# Patient Record
Sex: FEMALE | Race: WHITE
[De-identification: names, ages, dates, MRNs, and addresses within clinical notes are randomized per-mention and may not be internally consistent; named-entity substitution may affect disease eponyms.]

---

## 2017-03-07 ENCOUNTER — HOSPITAL ENCOUNTER (OUTPATIENT)
Dept: HOSPITAL 75 - FS | Age: 82
End: 2017-03-07
Attending: INTERNAL MEDICINE
Payer: MEDICARE

## 2017-03-07 DIAGNOSIS — Z79.899: ICD-10-CM

## 2017-03-07 DIAGNOSIS — Z92.21: ICD-10-CM

## 2017-03-07 DIAGNOSIS — E78.00: ICD-10-CM

## 2017-03-07 DIAGNOSIS — G62.9: ICD-10-CM

## 2017-03-07 DIAGNOSIS — E11.40: ICD-10-CM

## 2017-03-07 DIAGNOSIS — Z92.3: ICD-10-CM

## 2017-03-07 DIAGNOSIS — I10: ICD-10-CM

## 2017-03-07 DIAGNOSIS — Z90.11: ICD-10-CM

## 2017-03-07 DIAGNOSIS — L20.9: ICD-10-CM

## 2017-03-07 DIAGNOSIS — C50.111: Primary | ICD-10-CM

## 2017-03-07 DIAGNOSIS — Z17.1: ICD-10-CM

## 2017-03-07 PROCEDURE — 99213 OFFICE O/P EST LOW 20 MIN: CPT

## 2017-03-07 NOTE — XMS REPORT
Continuity of Care Document

 Created on: 2016



RAQUEL BRAR

External Reference #: [id number]

: 1935

Sex: Female



Demographics







 Address  [address] [place]  [address]

 

 Home Phone  [phone]

 

 Preferred Language  English

 

 Marital Status  Unknown

 

 Mu-ism Affiliation  Unknown

 

 Race  Unknown

 

 Ethnic Group  Unknown





Author







 Author  [facility]

 

 Organization  [facility]

 

 Address  Unknown

 

 Phone  Unavailable







Support







 Name  Relationship  Address  Phone

 

 CHRISTOS LAWLER MD  Caregiver  [address] [place]  [address] [phone]

 

 KELLY DONOVAN MD  Caregiver  [address] [place]  [address] [phone]

 

 SMITH BRAR  Next Of Kin  [address] [place]  [address] [phone]







Care Team Providers







 Care Team Member Name  Role  Phone

 

 KELLY DONOVAN MD  PCP  [phone]







Insurance Providers







 Payer Name  Policy Number  Subscriber Name  Relationship

 

 [employer]  [id number]  Raquel Brar  18 Self / Same As Patient

 

 [employer] Ins Name  [id number]  Raquel Brra  18 Self / Same As 
Patient







Advance Directives







 Directive  Response  Recorded Date/Time

 

 Advance Directives  No  16 11:07am

 

 Health Care Power of   No  16 11:07am

 

 Organ Donor  Yes  16 11:07am

 

 Resuscitation Status  Full Code  16 11:07am







Problems

No problem information available.



Medications

Current Home Medications







 Medication  Dose  Units  Route  Directions  Days/Qty  Instructions  Start Date

 

 Glipizide (Glucotrol) 10 Mg  10  Mg  Oral  Bedtime        07/13/15

 

 Gabapentin 300 Mg  300  Mg  Oral  Three Times A Day        07/13/15

 

 Levothyroxine Sodium (Levothroid) 100 Mcg  100  Mcg  Oral  Daily        07/13/
15

 

 Metformin Hcl (Glucophage) 1,000 Mg  1,000  Mg  Oral  Bedtime        07/13/15

 

 Sitagliptin Phosphate 100 Mg  100  Mg  Oral  Bedtime        07/13/15

 

 Lisinopril 10 Mg  5  Mg  Oral  Daily     TAKE 1/2 OF (10MG) TAB  07/17/15

 

 Hydrocodone/Acetaminophen 1 Each  1  Each  Oral  Every 4HRS as needed for Pain
        09/24/15

 

 Tramadol Hcl 50 Mg  50  Mg  Oral  Every 12 Hours as needed for Pain  30     09/
25/15





Past Home Medications







 Medication  Directions  Ordered  Status

 

 Lisinopril (Lisinopril 5MG) 5 Mg Tablet, 5 Mg Oral  Daily  07/13/15  
Discontinued







Social History







 Social History Problem  Response  Recorded Date/Time

 

 Alcohol Use  Denies Use  2015 12:23pm

 

 Recreational Drug Use  No  2015 12:23pm

 

 Recent Foreign Travel  No  2016 11:16am

 

 Recent Infectious Disease Exposure  No  2016 11:16am

 

 Smoking Status  Never a Smoker  2016 11:15am

 

 Do you dip or chew tobacco?  No  2015 12:00pm









 Query  Response  Start Date  Stop Date

 

 Smoking Status  Never a Smoker      







Hospital Discharge Instructions

 

Patient Instructions

Physician Instructions

 

New, Converted or Re-Newed RX:  Other

Plan of Care/Instructions/FU:  

repeat colonoscopy in 2 years

Activity as Tolerated:  Yes

Discharge Diet:  ADA Diet

Pneu Vac Indicated:  Yes

 

 

Care Plan

Patient Instructions:: repeat colonoscopy in 2 years

 



Plan of Care







 Discharge Date  16 2:10pm

 

 Instructions/Education Provided  COLONOSCOPY

 

 Prescriptions  See Medication Section







Functional Status

No functional status results.



Allergies, Adverse Reactions, Alerts







 Allergen  Type  Severity  Reaction  Status  Last Updated

 

 Morphine  Allergy  Unknown     Active  10/01/15

 

 hydrocodone ([id number])  Allergy  Unknown     Active  10/01/15

 

 Acetaminophen  Allergy  Unknown     Active  10/01/15

 

 Ibuprofen  Allergy  Unknown     Active  10/01/15

 

 ofloxacin ([id number])  Allergy  Unknown     Active  10/01/15

 

 loratadine ([id number])  Allergy  Unknown     Active  10/01/15

 

 atorvastatin ([id number])  Allergy  Unknown     Active  10/01/15







Immunizations

No immunization records.



Vital Signs

Acute Vital Signs





 Vital  Response  Date/Time

 

 Temperature (Fahrenheit)  98.4 degrees F (97.6 - 99.5)  2016 2:10pm

 

 Temperature (Calculated Celsius)  36.44920 degrees C (36.4 - 37.5)  2016 
2:10pm

 

 Temperature Source  Tympanic  2016 2:10pm

 

 Pulse Rate (adult)  67 bpm (60 - 90)  2016 2:10pm

 

 Respiratory Rate  20 bpm (12 - 24)  2016 2:10pm

 

 O2 Sat by Pulse Oximetry  97 % (88 - 100)  2016 2:10pm

 

 Blood Pressure  120/52 mm Hg  2016 2:10pm

 

 Pain      

 

    Numeric Pain Scale  0-No Pain  2016 2:10pm

 

    Pain Intensity  0  2016 2:00pm

 

 Height (Feet)  5 feet  2016 11:17am

 

 Height (Inches)  0.00 inches  2016 11:17am

 

 Height (Calculated Centimeters)  152.476284 cm  2016 11:17am

 

 Weight (Pounds)  151 pounds  2016 11:17am

 

 Weight (Ounces)  0.0 oz  2016 11:17am

 

 Weight (Calculated Grams)  47208.45 gm  2016 11:17am

 

 Weight (Calculated Kilograms)  68.469515 kilograms  2016 11:17am

 

 Calculated BMI  29.5  2016 11:17am







Results

No known relevant diagnostic tests, laboratory data and/or discharge summary.



Procedures

No known history of procedures.



Encounters







 Encounter  Location  Arrival/Admit Date  Discharge/Depart Date  Attending 
Provider

 

 Departed Surgical Day Care  [facility]  16 10:47am  
16 2:10pm  CHRISTOS LAWLER MD

 

 Departed Clinic  [facility]  16 5:37am  16 12:
58pm  CHRISTOS LAWLER MD

 

 Registered Clinic  [facility]  16 11:03am     LEELEE ROSALES

## 2017-06-06 ENCOUNTER — HOSPITAL ENCOUNTER (OUTPATIENT)
Dept: HOSPITAL 75 - FS | Age: 82
End: 2017-06-06
Attending: INTERNAL MEDICINE
Payer: MEDICARE

## 2017-06-06 DIAGNOSIS — Z90.11: ICD-10-CM

## 2017-06-06 DIAGNOSIS — E11.40: ICD-10-CM

## 2017-06-06 DIAGNOSIS — I10: ICD-10-CM

## 2017-06-06 DIAGNOSIS — Z17.1: ICD-10-CM

## 2017-06-06 DIAGNOSIS — E78.00: ICD-10-CM

## 2017-06-06 DIAGNOSIS — C50.111: Primary | ICD-10-CM

## 2017-06-06 DIAGNOSIS — Z92.3: ICD-10-CM

## 2017-06-06 DIAGNOSIS — Z79.899: ICD-10-CM

## 2017-06-06 DIAGNOSIS — Z92.21: ICD-10-CM

## 2017-06-06 DIAGNOSIS — G62.9: ICD-10-CM

## 2017-06-06 PROCEDURE — 99213 OFFICE O/P EST LOW 20 MIN: CPT

## 2017-06-21 ENCOUNTER — HOSPITAL ENCOUNTER (OUTPATIENT)
Dept: HOSPITAL 75 - PREOP | Age: 82
Discharge: HOME | End: 2017-06-21
Attending: SURGERY
Payer: MEDICARE

## 2017-06-21 VITALS — HEIGHT: 60 IN | BODY MASS INDEX: 30.23 KG/M2 | WEIGHT: 154 LBS

## 2017-06-21 VITALS — SYSTOLIC BLOOD PRESSURE: 145 MMHG | DIASTOLIC BLOOD PRESSURE: 62 MMHG

## 2017-06-21 DIAGNOSIS — C50.919: ICD-10-CM

## 2017-06-21 DIAGNOSIS — Z11.2: ICD-10-CM

## 2017-06-21 DIAGNOSIS — Z01.818: Primary | ICD-10-CM

## 2017-06-21 PROCEDURE — 87081 CULTURE SCREEN ONLY: CPT

## 2017-06-28 ENCOUNTER — HOSPITAL ENCOUNTER (OUTPATIENT)
Dept: HOSPITAL 75 - SDC | Age: 82
Discharge: HOME | End: 2017-06-28
Attending: SURGERY
Payer: MEDICARE

## 2017-06-28 VITALS — SYSTOLIC BLOOD PRESSURE: 153 MMHG | DIASTOLIC BLOOD PRESSURE: 66 MMHG

## 2017-06-28 VITALS — HEIGHT: 60 IN | WEIGHT: 154 LBS | BODY MASS INDEX: 30.23 KG/M2

## 2017-06-28 VITALS — DIASTOLIC BLOOD PRESSURE: 70 MMHG | SYSTOLIC BLOOD PRESSURE: 139 MMHG

## 2017-06-28 VITALS — SYSTOLIC BLOOD PRESSURE: 139 MMHG | DIASTOLIC BLOOD PRESSURE: 70 MMHG

## 2017-06-28 VITALS — SYSTOLIC BLOOD PRESSURE: 129 MMHG | DIASTOLIC BLOOD PRESSURE: 50 MMHG

## 2017-06-28 DIAGNOSIS — C50.919: Primary | ICD-10-CM

## 2017-06-28 DIAGNOSIS — E03.9: ICD-10-CM

## 2017-06-28 DIAGNOSIS — E11.9: ICD-10-CM

## 2017-06-28 DIAGNOSIS — Z79.899: ICD-10-CM

## 2017-06-28 DIAGNOSIS — I10: ICD-10-CM

## 2017-06-28 PROCEDURE — 82962 GLUCOSE BLOOD TEST: CPT

## 2017-06-28 NOTE — XMS REPORT
Continuity of Care Document

 Created on: 2017



REAGAN BRAR

External Reference #: [id number]

: 1935

Sex: Female



Demographics







 Address  [address] [place]  [address]

 

 Home Phone  [phone]

 

 Preferred Language  Unknown

 

 Marital Status  Unknown

 

 Yarsani Affiliation  Unknown

 

 Race  Unknown

 

 Ethnic Group  Unknown





Author







 Author  [facility]

 

 Organization  [facility]

 

 Address  Unknown

 

 Phone  Unavailable



                                                      



Allergies

                      





 Active                    Description                    Code                  
  Type                    Severity                    Reaction                  
  Onset                    Reported/Identified                    Relationship 
to Patient                    Clinical Status                

 

 Yes                    No Known Allergies                    [id number]       
             Drug Allergy                    Unknown                    N/A    
                                     2015                                
                          

 

 Yes                    loratadine                    [id number]               
     Drug Allergy                    Unknown                    N/A            
                             10/01/2015                                        
                  

 

 Yes                    morphine                    [id number]                 
   Drug Allergy                    Unknown                    N/A              
                           10/01/2015                                          
                

 

 Yes                    ofloxacin                    [id number]                
    Drug Allergy                    Unknown                    N/A             
                            10/01/2015                                         
                 

 

 Yes                    acetaminophen                    [id number]            
        Drug Allergy                    Unknown                    N/A         
                                2016                                     
                     

 

 Yes                    atorvastatin                    [id number]             
       Drug Allergy                    Unknown                    N/A          
                               2016                                      
                    

 

 Yes                    hydrocodone                    [id number]              
      Drug Allergy                    Unknown                    N/A           
                              2016                                       
                   

 

 Yes                    ibuprofen                    [id number]                
    Drug Allergy                    Unknown                    N/A             
                            2016                                         
                 



                                                                               
                                                                               



Medications

                                                                               
         



Problems

                      





 Date Dx Coded                    Attending                    Type            
        Code                    Diagnosis                    Diagnosed By      
          

 

 2015                    LEELEE ROSALES N                    Ot           
         611.72                                                          

 

 2015                    LEELEE ROSALES N                    Ot           
         611.72                                                          

 

 2015                    BOBBY, BOBAN N                    Ot           
         174.9                                                          

 

 2015                    NURIS CARRASCO, CHRISTOS NORTON                    Ot      
              211.3                    BENIGN NEOPLASM LG BOWEL                
                     

 

 2015                    NURIS CARRASCO, CHRISTOS NORTON                    Ot      
              V10.3                    HX OF BREAST MALIGNANCY                 
                    

 

 2015                    NURIS CARRASCO, CHRISTOS NORTON                    Ot      
              V58.69                    OT MED,LT,CURRENT USE                 
                    

 

 2015                    BOBBY, BOBAN N                    Ot           
         250.00                                                          

 

 2015                    BOBBY, BOBAN N                    Ot           
         355.8                                                          

 

 2015                    BOBBY, BOBAN N                    Ot           
         724.00                                                          

 

 2015                    BOBBY BOBAN N                    Ot           
         V10.3                                                          

 

 2015                    BOBBY, BOBAN N                    Ot           
         V58.69                                                          

 

 2015                    BOBBY, BOBAN N                    Ot           
         V67.1                                                          

 

 2015                    BOBBY, BOBAN N                    Ot           
         V67.2                                                          

 

 2015                    NURIS CARRASCO, CHRISTOS NORTON                    Ot      
              174.9                                                          

 

 2015                    NURIS CARRASCO, CHRISTOS NORTON                    Ot      
              250.00                                                          

 

 2015                    CHRISTOS LAWLER MD                    Ot      
              174.9                    MALIGN NEOPL BREAST NOS                 
                    

 

 2015                    NURIS CARRASCO, CHRISTOS NORTON                    Ot      
              250.00                    DIAB VIC WO COMPL, TYPE II OR UNSPEC 
TY                                     

 

 2015                    LEELEE ROSALES N                    Ot           
         174.9                                                          

 

 2015                    LEELEE ROSALES                    Ot           
         174.9                                                          

 

 2015                    PRACHI CARRASCO FACC, ALI FACP CCDS                    
Ot                    174.9                                                    
      

 

 2015                    PRACHI CARRASCO FACC, ALI FACP CCDS                    
Ot                    250.00                                                   
       

 

 2015                    PRACHI CARRASCO FACC, ALI FACP CCDS                    
Ot                    272.4                                                    
      

 

 2015                    PRACHI CARRASCO FACC, ALI FACP CCDS                    
Ot                    278.00                                                   
       

 

 2015                    PRACHI CARRASCO FACC, ALI FACP CCDS                    
Ot                    401.9                                                    
      

 

 2015                    PRACHI CARRASCO FACC, ALI FACP CCDS                    
Ot                    V85.30                                                   
       

 

 2015                    LEELEE ROSALES                    Ot           
         174.9                                                          

 

 08/10/2015                    CHRISTOS LAWLER MD                    Ot      
              174.9                                                          

 

 08/10/2015                    NURIS CARRASCO, CHRISTOS NORTON                    Ot      
              V72.63                                                          

 

 08/10/2015                    NURIS CARRASCO, CHRISTOS NORTON                    Ot      
              V74.8                                                          

 

 2015                    LEELEE ROSALES                    Ot           
         174.9                                                          

 

 2015                    LEELEE ROSALES                    Ot           
         174.9                                                          

 

 2015                    PRACHI CARRASCO FACC, ALI FACP CCDS                    
Ot                    174.9                                                    
      

 

 2015                    PRACHI CARRASCO FACC, ALI FACP CCDS                    
Ot                    250.00                                                   
       

 

 2015                    PRACHI CARRASCO FACC, ALI FACP CCDS                    
Ot                    272.4                                                    
      

 

 2015                    PRACHI CARRASCO FACC, ALI FACP CCDS                    
Ot                    278.00                                                   
       

 

 2015                    PRACHI CARRASCO FACC, ALI FACP CCDS                    
Ot                    401.9                                                    
      

 

 2015                    PRACHI CARRASCO FACC, ALI FACP CCDS                    
Ot                    V85.30                                                   
       

 

 2015                    LEELEE ROSALES N                    Ot           
         174.9                                                          

 

 2015                    CHRISTOS LAWLER MD                    Ot      
              174.9                    MALIGN NEOPL BREAST NOS                 
                    

 

 2015                    CHRISTOS LAWLER MD                    Ot      
              250.00                    DIAB VIC WO COMPL, TYPE II OR UNSPEC 
TY                                     

 

 2015                    NURIS CARRASCO, CHRISTOS M                    Ot      
              786.6                                                          

 

 2015                    NURIS CARRASCO, CHRISTOS M                    Ot      
              V45.71                                                          

 

 10/14/2015                    BOBBY, BOBAN N                    Ot           
         174.9                                                          

 

 10/14/2015                    BOBBY, BOBAN N                    Ot           
         V15.3                                                          

 

 10/14/2015                    BOBBY, BOBAN N                    Ot           
         V45.71                                                          

 

 10/14/2015                    BOBBY, BOBAN N                    Ot           
         V58.69                                                          

 

 10/14/2015                    BOBBY, BOBAN N                    Ot           
         V87.41                                                          

 

 10/26/2015                    WALDRON HILAH S ARNP                    Ot     
               C50.911                                                          

 

 10/26/2015                    WALDRON, HILAH S ARNP                    Ot     
               E11.9                                                          

 

 10/26/2015                    WALDRON, HILAH S ARNP                    Ot     
               E78.0                                                          

 

 10/26/2015                    WALDRON, HILAH S ARNP                    Ot     
               G62.9                                                          

 

 10/26/2015                    WALDRON, HILAH S ARNP                    Ot     
               I10                                                          

 

 10/26/2015                    WALDRON HILAH S ARNP                    Ot     
               Z17.1                                                          

 

 10/26/2015                    WALDRON, HILAH S ARNP                    Ot     
               Z79.899                                                          

 

 10/26/2015                    WALDRON, HILAH S ARNP                    Ot     
               Z90.11                                                          

 

 10/26/2015                    WALDRON, HILAH S ARNP                    Ot     
               Z92.21                                                          

 

 10/26/2015                    WALDRON HILAH S ARNP                    Ot     
               Z92.3                                                          

 

 10/29/2015                    BOBBY, BOBAN N                    Ot           
         C50.911                                                          

 

 10/29/2015                    BOBBY, BOBAN N                    Ot           
         E11.9                                                          

 

 10/29/2015                    BOBBY, BOBAN N                    Ot           
         E78.0                                                          

 

 10/29/2015                    BOBBY, BOBAN N                    Ot           
         G62.9                                                          

 

 10/29/2015                    BOBBY, BOBAN N                    Ot           
         I10                                                          

 

 10/29/2015                    BOBBY, BOBAN N                    Ot           
         Z17.1                                                          

 

 10/29/2015                    BOBBY, BOBAN N                    Ot           
         Z51.11                                                          

 

 10/29/2015                    BOBBY, BOBAN N                    Ot           
         Z79.899                                                          

 

 10/29/2015                    BOBBY, BOBAN N                    Ot           
         Z90.11                                                          

 

 10/29/2015                    BOBBY, BOBAN N                    Ot           
         Z92.21                                                          

 

 10/29/2015                    BOBBY, BOBAN N                    Ot           
         Z92.3                                                          

 

 2015                    BOBBY, BOBAN N                    Ot           
         C50.911                                                          

 

 2015                    BOBBY, BOBAN N                    Ot           
         E11.9                                                          

 

 2015                    BOBBY, AMYAN N                    Ot           
         E78.0                                                          

 

 2015                    BOBBY, BOBAN N                    Ot           
         G62.9                                                          

 

 2015                    BOBBY, BOBAN N                    Ot           
         I10                                                          

 

 2015                    BOBBY, BOBAN N                    Ot           
         Z17.1                                                          

 

 2015                    BOBBY, BOBAN N                    Ot           
         Z51.11                                                          

 

 2015                    BOBBY, BOBAN N                    Ot           
         Z79.899                                                          

 

 2015                    BOBBY, BOBAN N                    Ot           
         Z90.11                                                          

 

 2015                    BOBBY, BOBAN N                    Ot           
         Z92.21                                                          

 

 2015                    BOBBY, BOBAN N                    Ot           
         Z92.3                                                          

 

 2015                    BOBBY, BOBAN N                    Ot           
         C50.911                                                          

 

 2015                    BOBBY, LEELEE N                    Ot           
         E11.9                                                          

 

 2015                    BOBBY, BOBKATE N                    Ot           
         E78.0                                                          

 

 2015                    BOBBY, BOBAN N                    Ot           
         G62.9                                                          

 

 2015                    BOBBY, LEELEE N                    Ot           
         I10                                                          

 

 2015                    BOBBY, BOBKATE N                    Ot           
         Z17.1                                                          

 

 2015                    BOBBY, BOBAN N                    Ot           
         Z51.11                                                          

 

 2015                    BOBBY, BOBAN N                    Ot           
         Z79.899                                                          

 

 2015                    BOBBY, BOBAN N                    Ot           
         Z90.11                                                          

 

 2015                    BOBBY, BOBAN N                    Ot           
         Z92.21                                                          

 

 2015                    BOBBY, BOBAN N                    Ot           
         Z92.3                                                          

 

 2015                    BOBBY, BOBAN N                    Ot           
         174.9                    MALIGN NEOPL BREAST NOS                      
               

 

 2015                    BOBBY BOBAN N                    Ot           
         250.00                    DIAB VIC WO COMPL, TYPE II OR UNSPEC TY    
                                 

 

 2015                    BOBBY, BOBAN N                    Ot           
         272.0                    PURE HYPERCHOLESTEROLEM                      
               

 

 2015                    BOBBY BOBAN N                    Ot           
         357.9                    INFLAM/TOX NEUROPTHY NOS                     
                

 

 2015                    BOBBY, BOBAN N                    Ot           
         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   
                                  

 

 2015                    BOBBYAMYAN N                    Ot           
         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     
                                

 

 2015                    BOBBY BOBAN N                    Ot           
         E78.0                    PURE HYPERCHOLESTEROLEMIA                    
                 

 

 2015                    BOBBYAMYAN N                    Ot           
         G62.9                    POLYNEUROPATHY, UNSPECIFIED                  
                   

 

 2015                    BOBBY BOBAN N                    Ot           
         I10                    ESSENTIAL (PRIMARY) HYPERTENSION               
                      

 

 2015                    LEELEE ROSALES                    Ot           
         V15.3                    HX OF IRRADIATION                            
         

 

 2015                    LEELEE ROSALES                    Ot           
         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       
                              

 

 2015                    LEELEE ROSALES                    Ot           
         V58.11                    ENCOUNTER FOR ANTINEOPLASTIC CHEMOTHERAP    
                                 

 

 2015                    LEELEE ROSALES                    Ot           
         V58.69                    OTH MED,LT,CURRENT USE                      
               

 

 2015                    LEELEE ROSALES                    Ot           
         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      
                               

 

 2015                    LEELEE ROSALES                    Ot           
         V87.41                    PERSONAL HISTORY OF ANTINEOPLASTIC CHEMO    
                                 

 

 2015                    LEELEE ROSALES                    Ot           
         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      
                               

 

 2015                    LEELEE ROSALES                    Ot           
         Z51.11                    ENCOUNTER FOR ANTINEOPLASTIC CHEMOTHERAP    
                                 

 

 2015                    LEELEE ROSALES                    Ot           
         Z79.899                    OTHER LONG TERM (CURRENT) DRUG THERAPY     
                                

 

 2015                    LEELEE ROSALES                    Ot           
         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    
                                 

 

 2015                    LEELEE ROSALES                    Ot           
         Z92.21                    PERSONAL HISTORY OF ANTINEOPLASTIC CHEMO    
                                 

 

 2015                    LEELEE ROSALES                    Ot           
         Z92.3                    PERSONAL HISTORY OF IRRADIATION              
                       

 

 2016                    DANIA WALDRON ARNP                    Ot     
               C50.111                                                          

 

 2016                    DANIA WALDRON ARNP                    Ot     
               E11.40                                                          

 

 2016                    DANIA WALDRON ARNP                    Ot     
               E11.9                                                          

 

 2016                    DANIA WALDRON ARNP                    Ot     
               E78.0                                                          

 

 2016                    DANIA WALDRON ARNP                    Ot     
               I10                                                          

 

 2016                    DANIA WALDRON ARNP                    Ot     
               Z17.1                                                          

 

 2016                    DANIA WALDRON ARNP                    Ot     
               Z79.899                                                          

 

 2016                    DANIA WALDRON ARNP                    Ot     
               Z90.11                                                          

 

 2016                    LEELEE ROSALES MALENA                    Ot           
         C50.911                                                          

 

 2016                    LEELEE ROSALES MALENA                    Ot           
         E11.9                                                          

 

 2016                    LEELEE ROSALES MALENA                    Ot           
         E78.0                                                          

 

 2016                    LEELEE ROSALES MALENA                    Ot           
         G62.9                                                          

 

 2016                    LEELEE ROSALES MALENA                    Ot           
         I10                                                          

 

 2016                    BOBBY, BOBAN N                    Ot           
         Z17.1                                                          

 

 2016                    BOBBY, BOBAN N                    Ot           
         Z51.11                                                          

 

 2016                    BOBBY, BOBAN N                    Ot           
         Z79.899                                                          

 

 2016                    BOBBY, BOBAN N                    Ot           
         Z90.11                                                          

 

 2016                    BOBBY, BOBAN N                    Ot           
         Z92.21                                                          

 

 2016                    BOBBY, BOBAN N                    Ot           
         Z92.3                                                          

 

 2016                    BOBBY, BOBAN N                    Ot           
         C50.911                                                          

 

 2016                    BOBBY, BOBAN N                    Ot           
         E11.9                                                          

 

 2016                    BOBBY, BOBAN N                    Ot           
         E78.0                                                          

 

 2016                    BOBBY, BOBAN N                    Ot           
         G62.9                                                          

 

 2016                    BOBBY, BOBAN N                    Ot           
         I10                                                          

 

 2016                    BOBBY, BOBAN N                    Ot           
         Z17.1                                                          

 

 2016                    BOBBY, BOBAN N                    Ot           
         Z79.899                                                          

 

 2016                    BOBBY, LEELEE N                    Ot           
         Z90.11                                                          

 

 2016                    BOBBY, LEELEE N                    Ot           
         Z92.21                                                          

 

 2016                    BOBBY, LEELEE N                    Ot           
         Z92.3                                                          

 

 2016                    DANIA WALDRON S ARNP                    Ot     
               C50.111                                                          

 

 2016                    DANIA WALDRON S ARNP                    Ot     
               C50.911                                                          

 

 2016                    DANIA WALDRON S ARNP                    Ot     
               E11.40                                                          

 

 2016                    DANIA WALDRON S ARNP                    Ot     
               E11.9                                                          

 

 2016                    DANIA WALDRON S ARNP                    Ot     
               E78.0                                                          

 

 2016                    BRUNILDA WALDRONAH S ARNP                    Ot     
               G62.9                                                          

 

 2016                    DANIA WALDRON S ARNP                    Ot     
               I10                                                          

 

 2016                    BRUNILDA WALDRONAH S ARNP                    Ot     
               Z17.1                                                          

 

 2016                    DANIA WALDRON S ARNP                    Ot     
               Z79.899                                                          

 

 2016                    BRUNILDA WALDRONAH S ARNP                    Ot     
               Z90.11                                                          

 

 2016                    DANIA WALDRON S ARNP                    Ot     
               Z92.21                                                          

 

 2016                    DANIA WALDRON S ARNP                    Ot     
               Z92.3                                                          

 

 2016                    BOBBY, AMYAN N                    Ot           
         C50.911                                                          

 

 2016                    BOBBY, BOBAN N                    Ot           
         E11.9                                                          

 

 2016                    BOBBYLEELEE MEDINA N                    Ot           
         E78.0                                                          

 

 2016                    BOBBYLEELEE MEDINA N                    Ot           
         G62.9                                                          

 

 2016                    BOBBY LEELEE N                    Ot           
         I10                                                          

 

 2016                    BOBBYLEELEE MEDINA MALENA                    Ot           
         Z17.1                                                          

 

 2016                    LEELEE ROSALES N                    Ot           
         Z79.899                                                          

 

 2016                    LEELEE ROSALES N                    Ot           
         Z90.11                                                          

 

 2016                    LEELEE ROSALES N                    Ot           
         Z92.21                                                          

 

 2016                    LEELEE ROSALES MALENA                    Ot           
         Z92.3                                                          

 

 2016                    LEELEE ROSALES MALENA                    Ot           
         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   
                                  

 

 2016                    BOBBY AMYKATE MALENA                    Ot           
         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     
                                

 

 2016                    LEELEE ROSALES MALENA                    Ot           
         E78.0                    PURE HYPERCHOLESTEROLEMIA                    
                 

 

 2016                    LEELEE ROSALES MALENA                    Ot           
         G62.9                    POLYNEUROPATHY, UNSPECIFIED                  
                   

 

 2016                    BOBBY AMYKATE MALENA                    Ot           
         I10                    ESSENTIAL (PRIMARY) HYPERTENSION               
                      

 

 2016                    LEELEE ROSALES N                    Ot           
         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      
                               

 

 2016                    LEELEE ROSALES N                    Ot           
         Z51.11                    ENCOUNTER FOR ANTINEOPLASTIC CHEMOTHERAP    
                                 

 

 2016                    BOBBY AMYKATE MALENA                    Ot           
         Z79.899                    OTHER LONG TERM (CURRENT) DRUG THERAPY     
                                

 

 2016                    BOBBY LEELEE N                    Ot           
         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    
                                 

 

 2016                    LEELEE ROSALES N                    Ot           
         Z92.21                    PERSONAL HISTORY OF ANTINEOPLASTIC CHEMO    
                                 

 

 2016                    BOBBYAMYKATE N                    Ot           
         Z92.3                    PERSONAL HISTORY OF IRRADIATION              
                       

 

 2016                    BOBBY LEELEE N                    Ot           
         C50.111                                                          

 

 2016                    BOBBYLEELEE N                    Ot           
         E11.40                                                          

 

 2016                    BOBBYLEELEE N                    Ot           
         E11.9                                                          

 

 2016                    BOBBYLEELEE N                    Ot           
         E78.0                                                          

 

 2016                    BOBBYLEELEE N                    Ot           
         I10                                                          

 

 2016                    BOBBY LEELEE N                    Ot           
         L20.9                                                          

 

 2016                    BOBBYLEELEE N                    Ot           
         Z17.1                                                          

 

 2016                    BOBBYLEELEE N                    Ot           
         Z79.899                                                          

 

 2016                    BOBBYLEELEE MEDINA N                    Ot           
         Z90.11                                                          

 

 2016                    BOBBYLEELEE MEDINA N                    Ot           
         Z92.21                                                          

 

 2016                    BOBBYLEELEE MEDINA N                    Ot           
         Z92.3                                                          

 

 2016                    BOBBYLEELEE MEDINA N                    Ot           
         C50.111                                                          

 

 2016                    BOBBYLEELEE MEDINA N                    Ot           
         E11.40                                                          

 

 2016                    BOBBYLEELEE MEDINA N                    Ot           
         E11.9                                                          

 

 2016                    BOBBYLEELEE MEDINA N                    Ot           
         E78.0                                                          

 

 2016                    BOBBYLEELEE MEDINA N                    Ot           
         I10                                                          

 

 2016                    BOBBY, BOBAN N                    Ot           
         L20.9                                                          

 

 2016                    BOBBYAMY MEDINAAN N                    Ot           
         Z17.1                                                          

 

 2016                    BOBBY, BOBAN N                    Ot           
         Z79.899                                                          

 

 2016                    BOBBYLEELEE MEDINA N                    Ot           
         Z90.11                                                          

 

 2016                    BOBBYLEELEE MEDINA N                    Ot           
         Z92.21                                                          

 

 2016                    BOBBYLEELEE MEDINA N                    Ot           
         Z92.3                                                          

 

 2016                    BOBBYLEELEE MEDINA N                    Ot           
         C50.111                    MALIGNANT NEOPLASM OF CENTRAL PORTION OF   
                                  

 

 2016                    LEELEE ROSALES N                    Ot           
         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    
                                 

 

 2016                    LEELEE ROSALES N                    Ot           
         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     
                                

 

 2016                    LEELEE ROSALES N                    Ot           
         E78.0                    PURE HYPERCHOLESTEROLEMIA                    
                 

 

 2016                    LEELEE ROSALES N                    Ot           
         I10                    ESSENTIAL (PRIMARY) HYPERTENSION               
                      

 

 2016                    LEELEE ROSALES N                    Ot           
         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               
                      

 

 2016                    LEELEE ROSALES N                    Ot           
         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      
                               

 

 2016                    LEELEE ROSALES N                    Ot           
         Z79.899                    OTHER LONG TERM (CURRENT) DRUG THERAPY     
                                

 

 2016                    LEELEE ROSALES N                    Ot           
         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    
                                 

 

 2016                    LEELEE ROSALES N                    Ot           
         Z92.21                    PERSONAL HISTORY OF ANTINEOPLASTIC CHEMO    
                                 

 

 2016                    LEELEE ROSALES N                    Ot           
         Z92.3                    PERSONAL HISTORY OF IRRADIATION              
                       

 

 2016                    LEELEE ROSALES N                    Ot           
         C50.111                    MALIGNANT NEOPLASM OF CENTRAL PORTION OF   
                                  

 

 2016                    LEELEE ROSALES N                    Ot           
         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    
                                 

 

 2016                    LEELEE ROSALES N                    Ot           
         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     
                                

 

 2016                    LEELEE ROSALES                    Ot           
         E78.0                    PURE HYPERCHOLESTEROLEMIA                    
                 

 

 2016                    LEELEE ROSALES N                    Ot           
         I10                    ESSENTIAL (PRIMARY) HYPERTENSION               
                      

 

 2016                    LEELEE ROSALES                    Ot           
         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               
                      

 

 2016                    LEELEE ROSALES                    Ot           
         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      
                               

 

 2016                    LEELEE ROSALES                    Ot           
         Z79.899                    OTHER LONG TERM (CURRENT) DRUG THERAPY     
                                

 

 2016                    LEELEE ROSALES                    Ot           
         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    
                                 

 

 2016                    LEELEE ROSALES                    Ot           
         Z92.21                    PERSONAL HISTORY OF ANTINEOPLASTIC CHEMO    
                                 

 

 2016                    LEELEE ROSALES N                    Ot           
         Z92.3                    PERSONAL HISTORY OF IRRADIATION              
                       

 

 2016                    LEELEE ROSALES                    Ot           
         C50.111                    MALIGNANT NEOPLASM OF CENTRAL PORTION OF   
                                  

 

 2016                    LEELEE ROSALES                    Ot           
         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    
                                 

 

 2016                    LEELEE ROSALES N                    Ot           
         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     
                                

 

 2016                    LEELEE ROSALES N                    Ot           
         E78.0                    PURE HYPERCHOLESTEROLEMIA                    
                 

 

 2016                    LEELEE ROSALES N                    Ot           
         I10                    ESSENTIAL (PRIMARY) HYPERTENSION               
                      

 

 2016                    LEELEE ROSALES                    Ot           
         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               
                      

 

 2016                    LEELEE ROSALES                    Ot           
         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      
                               

 

 2016                    LEELEE ROSALES                    Ot           
         Z79.899                    OTHER LONG TERM (CURRENT) DRUG THERAPY     
                                

 

 2016                    LEELEE ROSALES                    Ot           
         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    
                                 

 

 2016                    LEELEE ROSALES N                    Ot           
         Z92.21                    PERSONAL HISTORY OF ANTINEOPLASTIC CHEMO    
                                 

 

 2016                    LEELEE ROSALES N                    Ot           
         Z92.3                    PERSONAL HISTORY OF IRRADIATION              
                       

 

 2016                                         Ot                    355.8
                    MONONEURITIS LEG NOS                                     

 

 2016                                         Ot                    
724.00                    SPINAL STENOSIS NOS                                  
   

 

 2016                                         Ot                    V10.3
                    HX OF BREAST MALIGNANCY                                     

 

 2016                                         Ot                    
V58.69                    OTH MED,LT,CURRENT USE                               
      

 

 2016                                         Ot                    V67.1
                    RADIOTHERAPY FOLLOW-UP                                     

 

 2016                                         Ot                    V67.2
                    CHEMOTHERAPY FOLLOW-UP                                     

 

 2016                                         Ot                    355.8
                    MONONEURITIS LEG NOS                                     

 

 2016                                         Ot                    
724.00                    SPINAL STENOSIS NOS                                  
   

 

 2016                                         Ot                    V10.3
                    HX OF BREAST MALIGNANCY                                     

 

 2016                                         Ot                    
V58.69                    OTH MED,LT,CURRENT USE                               
      

 

 2016                                         Ot                    V67.1
                    RADIOTHERAPY FOLLOW-UP                                     

 

 2016                                         Ot                    V67.2
                    CHEMOTHERAPY FOLLOW-UP                                     

 

 2016                                         Ot                    
V76.12                    OTH SCREEN MAMMO-MALIGN NEOPLASM OF MUNDO             
                        

 

 2016                                         Ot                    355.8
                    MONONEURITIS LEG NOS                                     

 

 2016                                         Ot                    599.0
                    URIN TRACT INFECTION NOS                                   
  

 

 2016                                         Ot                    
719.41                    JOINT PAIN-SHLDER                                     

 

 2016                                         Ot                    
724.00                    SPINAL STENOSIS NOS                                  
   

 

 2016                                         Ot                    V10.3
                    HX OF BREAST MALIGNANCY                                     

 

 2016                                         Ot                    
V58.69                    OTH MED,LT,CURRENT USE                               
      

 

 2016                                         Ot                    V67.1
                    RADIOTHERAPY FOLLOW-UP                                     

 

 2016                                         Ot                    V67.2
                    CHEMOTHERAPY FOLLOW-UP                                     

 

 2016                                         Ot                    355.8
                    MONONEURITIS LEG NOS                                     

 

 2016                                         Ot                    
724.00                    SPINAL STENOSIS NOS                                  
   

 

 2016                                         Ot                    V10.3
                    HX OF BREAST MALIGNANCY                                     

 

 2016                                         Ot                    
V58.69                    OTH MED,LT,CURRENT USE                               
      

 

 2016                                         Ot                    V67.1
                    RADIOTHERAPY FOLLOW-UP                                     

 

 2016                                         Ot                    V67.2
                    CHEMOTHERAPY FOLLOW-UP                                     

 

 2016                                         Ot                    
724.00                    SPINAL STENOSIS NOS                                  
   

 

 2016                                         Ot                    V10.3
                    HX OF BREAST MALIGNANCY                                     

 

 2016                                         Ot                    
V58.69                    OTH MED,LT,CURRENT USE                               
      

 

 2016                                         Ot                    V67.1
                    RADIOTHERAPY FOLLOW-UP                                     

 

 2016                                         Ot                    V67.2
                    CHEMOTHERAPY FOLLOW-UP                                     

 

 2016                                         Ot                    174.9
                    MALIGN NEOPL BREAST NOS                                     

 

 2016                    LEELEE ROSALES                    Ot           
         250.00                    DIAB VIC WO COMPL, TYPE II OR UNSPEC TY    
                                 

 

 2016                    LEELEE ROSALES                    Ot           
         355.8                    MONONEURITIS LEG NOS                         
            

 

 2016                    LEELEE ROSALES                    Ot           
         724.00                    SPINAL STENOSIS NOS                         
            

 

 2016                    BOBBY, BOBAN N                    Ot           
         V10.3                    HX OF BREAST MALIGNANCY                      
               

 

 2016                    LEELEE ROSALES N                    Ot           
         V58.69                    OTH MED,LT,CURRENT USE                      
               

 

 2016                    LEELEE ROSALES N                    Ot           
         V67.1                    RADIOTHERAPY FOLLOW-UP                       
              

 

 2016                    BOBBY BOBKATE N                    Ot           
         V67.2                    CHEMOTHERAPY FOLLOW-UP                       
              

 

 2016                    LEELEE ROSALES N                    Ot           
         174.9                    MALIGN NEOPL BREAST NOS                      
               

 

 2016                    LEELEE ROSALES N                    Ot           
         793.89                    OTH (ABN) FINDINGS ON RADIOLOGICAL EXAMI    
                                 

 

 2016                    LEELEE ROSALES N                    Ot           
         V76.11                    SCRN MAMMO-HIGH RISK PT, MALIGNANT NEOPL    
                                 

 

 2016                    DANIA WALDRON ARNP                    Ot     
               174.9                    MALIGN NEOPL BREAST NOS                
                     

 

 2016                    DANIA WALDRON ARNP                    Ot     
               793.80                    UNSPEC ABNORMAL MAMMOGRAM             
                        

 

 2016                    LEELEE ROSALES N                    Ot           
         250.00                    DIAB VIC WO COMPL, TYPE II OR UNSPEC TY    
                                 

 

 2016                    BOBBY BOBAN N                    Ot           
         355.8                    MONONEURITIS LEG NOS                         
            

 

 2016                    BOBBY BOBAN N                    Ot           
         724.00                    SPINAL STENOSIS NOS                         
            

 

 2016                    LEELEE ROSALES N                    Ot           
         V10.3                    HX OF BREAST MALIGNANCY                      
               

 

 2016                    LEELEE ROSALES N                    Ot           
         V58.69                    OTH MED,LT,CURRENT USE                      
               

 

 2016                    BOBBY BOBKATE N                    Ot           
         V67.1                    RADIOTHERAPY FOLLOW-UP                       
              

 

 2016                    BOBBY BOBKATE N                    Ot           
         V67.2                    CHEMOTHERAPY FOLLOW-UP                       
              

 

 2016                    LEELEE ROSALES N                    Ot           
         174.9                    MALIGN NEOPL BREAST NOS                      
               

 

 2016                    BOBBY BOBKATE N                    Ot           
         250.00                    DIAB VIC WO COMPL, TYPE II OR UNSPEC TY    
                                 

 

 2016                    BOBBY BOBAN N                    Ot           
         355.8                    MONONEURITIS LEG NOS                         
            

 

 2016                    BOBBY BOBAN N                    Ot           
         724.00                    SPINAL STENOSIS NOS                         
            

 

 2016                    BOBBY BOBKATE N                    Ot           
         V10.3                    HX OF BREAST MALIGNANCY                      
               

 

 2016                    LEELEE ROSALES N                    Ot           
         V58.69                    OTH MED,LT,CURRENT USE                      
               

 

 2016                    BOBBY BOBKATE N                    Ot           
         V67.1                    RADIOTHERAPY FOLLOW-UP                       
              

 

 2016                    BOBBY BOBAN N                    Ot           
         V67.2                    CHEMOTHERAPY FOLLOW-UP                       
              

 

 2016                    LEELEE ROSALES N                    Ot           
         174.9                    MALIGN NEOPL BREAST NOS                      
               

 

 2016                    LEELEE ROSALES N                    Ot           
         174.9                    MALIGN NEOPL BREAST NOS                      
               

 

 2016                    LEELEE ROSALES N                    Ot           
         250.00                    DIAB VIC WO COMPL, TYPE II OR UNSPEC TY    
                                 

 

 2016                    LEELEE ROSALES N                    Ot           
         355.8                    MONONEURITIS LEG NOS                         
            

 

 2016                    LEELEE ROSALES N                    Ot           
         724.00                    SPINAL STENOSIS NOS                         
            

 

 2016                    LEELEE ROSALES N                    Ot           
         V10.3                    HX OF BREAST MALIGNANCY                      
               

 

 2016                    LEELEE ROSALES N                    Ot           
         V58.69                    OTH MED,LT,CURRENT USE                      
               

 

 2016                    LEELEE ROSALES N                    Ot           
         V67.1                    RADIOTHERAPY FOLLOW-UP                       
              

 

 2016                    LEELEE ROSALES N                    Ot           
         V67.2                    CHEMOTHERAPY FOLLOW-UP                       
              

 

 2016                    LEELEE ROSALES N                    Ot           
         174.9                    MALIGN NEOPL BREAST NOS                      
               

 

 2016                    LEELEE ROSALES N                    Ot           
         174.9                    MALIGN NEOPL BREAST NOS                      
               

 

 2016                    PRACHI CARRASCO FACC, ALI FACP CCDS                    
Ot                    174.9                    MALIGN NEOPL BREAST NOS         
                            

 

 2016                    PRACHI CARRASCO FACC, ALI FACP CCDS                    
Ot                    250.00                    DIAB VIC WO COMPL, TYPE II OR 
UNSPEC TY                                     

 

 2016                    PRACHI CARRASCO FACC, ALI FACP CCDS                    
Ot                    272.4                    HYPERLIPIDEMIA NEC/NOS          
                           

 

 2016                    PRACHI CARRASCO FACC, ALI FACP CCDS                    
Ot                    278.00                    OBESITY, NOS                   
                  

 

 2016                    PRACHI CARRASCO FACC, ALI FACP CCDS                    
Ot                    401.9                    HYPERTENSION NOS                
                     

 

 2016                    PRACHI CARRASCO FACC, ALI FACP CCDS                    
Ot                    V85.30                    BODY MASS INDEX 30.0-30.9, 
ADULT                                     

 

 2016                    CHRISTOS LAWLER MD                    Ot      
              V72.84                    EXAM PRE-OPERATIVE NOS                 
                    

 

 2016                    CHRISTOS LAWLER MD                    Ot      
              174.9                    MALIGN NEOPL BREAST NOS                 
                    

 

 2016                    CHRISTOS LAWLER MD                    Ot      
              V72.63                    PRE-PROCEDURAL LABORATORY EXAMINATION  
                                   

 

 2016                    CHRISTOS LAWLER MD                    Ot      
              V74.8                    SCREEN-BACTERIAL DIS NEC                
                     

 

 2016                    CHRISTOS LAWLER MD                    Ot      
              786.6                    CHEST SWELLING/MASS/LUMP                
                     

 

 2016                    NURIS CARRASCO, CHRISTOS NORTON                    Ot      
              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  
                                   

 

 2016                    LEELEE ROSALES                    Ot           
         174.9                    MALIGN NEOPL BREAST NOS                      
               

 

 2016                    LEELEE ROSALES                    Ot           
         V15.3                    HX OF IRRADIATION                            
         

 

 2016                    LEELEE ROSALES                    Ot           
         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       
                              

 

 2016                    LEELEE ROSALES                    Ot           
         V58.69                    OTH MED,LT,CURRENT USE                      
               

 

 2016                    LEELEE ROSALES                    Ot           
         V87.41                    PERSONAL HISTORY OF ANTINEOPLASTIC CHEMO    
                                 

 

 2016                    NURIS CARRASCO, CHRISTOS NORTON                    Ot      
              V10.3                    HX OF BREAST MALIGNANCY                 
                    

 

 2016                    NURIS CARRASCO, CHRISTOS NORTON                    Ot      
              V72.84                    EXAM PRE-OPERATIVE NOS                 
                    

 

 2016                    DANIA WALDRON ARNP                    Ot     
               C50.911                    MALIGNANT NEOPLASM OF UNSP SITE OF 
RIGHT                                     

 

 2016                    DANIA WALDRON ARNP                    Ot     
               E11.9                    TYPE 2 DIABETES MELLITUS WITHOUT 
COMPLIC                                     

 

 2016                    DANIA WALDRON ARNP                    Ot     
               E78.0                    PURE HYPERCHOLESTEROLEMIA              
                       

 

 2016                    DANIA WALDRON ARNP                    Ot     
               G62.9                    POLYNEUROPATHY, UNSPECIFIED            
                         

 

 2016                    DANIA WALDRON ARNP                    Ot     
               I10                    ESSENTIAL (PRIMARY) HYPERTENSION         
                            

 

 2016                    DANIA WALDRON ARNP                    Ot     
               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
                                     

 

 2016                    DANIA WALDRON ARNP                    Ot     
               Z79.899                    OTHER LONG TERM (CURRENT) DRUG 
THERAPY                                     

 

 2016                    DANIA WALDRON ARNP                    Ot     
               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 
NIP                                     

 

 2016                    DANIA WALDRON ARNP                    Ot     
               Z92.21                    PERSONAL HISTORY OF ANTINEOPLASTIC 
CHEMO                                     

 

 2016                    DANIA WALDRON ARNP                    Ot     
               Z92.3                    PERSONAL HISTORY OF IRRADIATION        
                             

 

 2016                    DANIA WALDRON ARNP                    Ot     
               C50.111                    MALIGNANT NEOPLASM OF CENTRAL PORTION 
OF                                     

 

 2016                    DANIA WALDRON ARNP                    Ot     
               E11.40                    TYPE 2 DIABETES MELLITUS WITH DIABETIC 
N                                     

 

 2016                    DANIA WALDRON ARNP                    Ot     
               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
                                     

 

 2016                    DANIA WALDRON ARNP                    Ot     
               Z79.899                    OTHER LONG TERM (CURRENT) DRUG 
THERAPY                                     

 

 2016                    WALDRONDANIA SIMMONS ARNP                    Ot     
               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 
NIP                                     

 

 2016                    WALDRONDANIA SIMMONS ARNP                    Ot     
               C50.111                    MALIGNANT NEOPLASM OF CENTRAL PORTION 
OF                                     

 

 2016                    WALDRONDANIA SIMMONS ARNP                    Ot     
               E11.40                    TYPE 2 DIABETES MELLITUS WITH DIABETIC 
N                                     

 

 2016                    WALDRONDANIA SIMMONS ARNP                    Ot     
               E11.9                    TYPE 2 DIABETES MELLITUS WITHOUT 
COMPLIC                                     

 

 2016                    WALDRONDANIA SIMMONS ARNP                    Ot     
               E78.0                    PURE HYPERCHOLESTEROLEMIA              
                       

 

 2016                    WALDRONDANIA SIMMONS ARNP                    Ot     
               I10                    ESSENTIAL (PRIMARY) HYPERTENSION         
                            

 

 2016                    WALDRONDANIA SIMMONS ARNP                    Ot     
               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
                                     

 

 2016                    WALDRONDANIA SIMMONS ARNP                    Ot     
               Z79.899                    OTHER LONG TERM (CURRENT) DRUG 
THERAPY                                     

 

 2016                    CHIVO DANIA SIMMONS ARNP                    Ot     
               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 
NIP                                     

 

 2016                    WALDRONDANIA SIMMONS ARNP                    Ot     
               C50.111                    MALIGNANT NEOPLASM OF CENTRAL PORTION 
OF                                     

 

 2016                    WALDRONDANIA SIMMONS ARNP                    Ot     
               C50.911                    MALIGNANT NEOPLASM OF UNSP SITE OF 
RIGHT                                     

 

 2016                    WALDRONDANIA SIMMONS ARNP                    Ot     
               E11.40                    TYPE 2 DIABETES MELLITUS WITH DIABETIC 
N                                     

 

 2016                    WALDRONDANIA SIMMONS ARNP                    Ot     
               E11.9                    TYPE 2 DIABETES MELLITUS WITHOUT 
COMPLIC                                     

 

 2016                    CHIVO DANIA SIMMONS ARNP                    Ot     
               E78.0                    PURE HYPERCHOLESTEROLEMIA              
                       

 

 2016                    CHIVODANIA ARNP                    Ot     
               G62.9                    POLYNEUROPATHY, UNSPECIFIED            
                         

 

 2016                    WALDRONDANIA SIMMONS ARNP                    Ot     
               I10                    ESSENTIAL (PRIMARY) HYPERTENSION         
                            

 

 2016                    CHIVODANIA ARNP                    Ot     
               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
                                     

 

 2016                    CHIVO DANIA SIMMONS ARNP                    Ot     
               Z79.899                    OTHER LONG TERM (CURRENT) DRUG 
THERAPY                                     

 

 2016                    CHIVO DANIA SIMMONS ARNP                    Ot     
               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 
NIP                                     

 

 2016                    CHIVO DANIA SIMMONS ARNP                    Ot     
               Z92.21                    PERSONAL HISTORY OF ANTINEOPLASTIC 
CHEMO                                     

 

 2016                    BRUNILDA WALDRONBRANDON SIMMONS ARNP                    Ot     
               Z92.3                    PERSONAL HISTORY OF IRRADIATION        
                             

 

 2016                    LEELEE ROSALES N                    Ot           
         C50.111                    MALIGNANT NEOPLASM OF CENTRAL PORTION OF   
                                  

 

 2016                    LEELEE ROSALES N                    Ot           
         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    
                                 

 

 2016                    LEELEE ROSALES N                    Ot           
         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     
                                

 

 2016                    LEELEE ROSALES N                    Ot           
         E78.0                    PURE HYPERCHOLESTEROLEMIA                    
                 

 

 2016                    LEELEE ROSALES N                    Ot           
         I10                    ESSENTIAL (PRIMARY) HYPERTENSION               
                      

 

 2016                    LEELEE ROSALES N                    Ot           
         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               
                      

 

 2016                    BOBBYLEELEE MEDINA N                    Ot           
         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      
                               

 

 2016                    LEELEE ROSALES N                    Ot           
         Z79.899                    OTHER LONG TERM (CURRENT) DRUG THERAPY     
                                

 

 2016                    BOBBYLEELEE MEDINA N                    Ot           
         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    
                                 

 

 2016                    LEELEE ROSALES N                    Ot           
         Z92.21                    PERSONAL HISTORY OF ANTINEOPLASTIC CHEMO    
                                 

 

 2016                    LEELEE ROSALES N                    Ot           
         Z92.3                    PERSONAL HISTORY OF IRRADIATION              
                       

 

 2016                    LEELEE ROSALES N                    Ot           
         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   
                                  

 

 2016                    LEELEE ROSALES N                    Ot           
         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     
                                

 

 2016                    LEELEE ROSALES N                    Ot           
         E78.0                    PURE HYPERCHOLESTEROLEMIA                    
                 

 

 2016                    LEELEE ROSALES N                    Ot           
         G62.9                    POLYNEUROPATHY, UNSPECIFIED                  
                   

 

 2016                    LEELEE ROSALES N                    Ot           
         I10                    ESSENTIAL (PRIMARY) HYPERTENSION               
                      

 

 2016                    LEELEE ROSALES N                    Ot           
         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      
                               

 

 2016                    LEELEE ROSALES N                    Ot           
         Z79.899                    OTHER LONG TERM (CURRENT) DRUG THERAPY     
                                

 

 2016                    LEELEE ROSALES N                    Ot           
         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    
                                 

 

 2016                    LEELEE ROSALES N                    Ot           
         Z92.21                    PERSONAL HISTORY OF ANTINEOPLASTIC CHEMO    
                                 

 

 2016                    LEELEE ROSALES N                    Ot           
         Z92.3                    PERSONAL HISTORY OF IRRADIATION              
                       

 

 2016                    LEELEE ROSALES N                    Ot           
         C50.111                    MALIGNANT NEOPLASM OF CENTRAL PORTION OF   
                                  

 

 2016                    LEELEE ROSALES N                    Ot           
         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    
                                 

 

 2016                    LEELEE ROSALES N                    Ot           
         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     
                                

 

 2016                    LEELEE ROSALES N                    Ot           
         E78.0                    PURE HYPERCHOLESTEROLEMIA                    
                 

 

 2016                    LEELEE ROSALES N                    Ot           
         I10                    ESSENTIAL (PRIMARY) HYPERTENSION               
                      

 

 2016                    LEELEE ROSALES N                    Ot           
         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               
                      

 

 2016                    LEELEE ROSALES N                    Ot           
         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      
                               

 

 2016                    LEELEE ROSALES N                    Ot           
         Z79.899                    OTHER LONG TERM (CURRENT) DRUG THERAPY     
                                

 

 2016                    LEELEE ROSALES N                    Ot           
         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    
                                 

 

 2016                    LEELEE ROSALES N                    Ot           
         Z92.21                    PERSONAL HISTORY OF ANTINEOPLASTIC CHEMO    
                                 

 

 2016                    LEELEE ROSALES N                    Ot           
         Z92.3                    PERSONAL HISTORY OF IRRADIATION              
                       

 

 2016                    PRACHI CARRASCO FAC, ALI FACP CCDS                    
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 
PORTION OF                                     

 

 2016                    PRACHI CARRASCO FACC, ALI FACP CCDS                    
Ot                    E11.9                    TYPE 2 DIABETES MELLITUS WITHOUT 
COMPLIC                                     

 

 2016                    PRACHI CARRASCO FAC, ALI FACP CCDS                    
Ot                    E78.0                    PURE HYPERCHOLESTEROLEMIA       
                              

 

 2016                    PRACHI CARRASCO FACC, ALI FACP CCDS                    
Ot                    I10                    ESSENTIAL (PRIMARY) HYPERTENSION  
                                   

 

 2016                    LEELEE ROSALES N                    Ot           
         C50.111                    MALIGNANT NEOPLASM OF CENTRAL PORTION OF   
                                  

 

 2016                    LEELEE ROSALES N                    Ot           
         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    
                                 

 

 2016                    LEELEE ROSALES N                    Ot           
         E78.0                    PURE HYPERCHOLESTEROLEMIA                    
                 

 

 2016                    LEELEE ROSALES N                    Ot           
         I10                    ESSENTIAL (PRIMARY) HYPERTENSION               
                      

 

 2016                    LEELEE ROSALES N                    Ot           
         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               
                      

 

 2016                    LEELEE ROSALES N                    Ot           
         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      
                               

 

 2016                    LEELEE ROSALES N                    Ot           
         Z79.899                    OTHER LONG TERM (CURRENT) DRUG THERAPY     
                                

 

 2016                    LEELEE ROSALES N                    Ot           
         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    
                                 

 

 2016                    LEELEE ROSALES N                    Ot           
         Z92.21                    PERSONAL HISTORY OF ANTINEOPLASTIC CHEMO    
                                 

 

 2016                    LEELEE ROSALES MALENA                    Ot           
         Z92.3                    PERSONAL HISTORY OF IRRADIATION              
                       

 

 2016                    [facility]                    
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 
PORTION OF                                     

 

 2016                    PRACHI CARRASCO FAC, [facility] FACP CCDS                    
Ot                    E11.9                    TYPE 2 DIABETES MELLITUS WITHOUT 
COMPLIC                                     

 

 2016                    [facility]                    
Ot                    E78.0                    PURE HYPERCHOLESTEROLEMIA       
                              

 

 2016                    [facility]                    
Ot                    I10                    ESSENTIAL (PRIMARY) HYPERTENSION  
                                   

 

 2016                    NURIS CARRASCO, [facility]                    Ot      
              Z01.818                    ENCOUNTER FOR OTHER PREPROCEDURAL 
EXAMIN                                     

 

 2016                    NURIS CARRASCO, [facility] NORTON                    Ot      
              Z86.010                    PERSONAL HISTORY OF COLONIC POLYPS    
                                 

 

 2016                    NURIS CARRASCO, [facility] NORTON                    Ot      
              Z01.818                    ENCOUNTER FOR OTHER PREPROCEDURAL 
EXAMIN                                     

 

 2016                    NURIS CARRASCO, [facility]ST[facility] NORTON                    Ot      
              Z86.010                    PERSONAL HISTORY OF COLONIC POLYPS    
                                 

 

 2016                                         Ot                    355.8
                    MONONEURITIS LEG NOS                                     

 

 2016                                         Ot                    
724.00                    SPINAL STENOSIS NOS                                  
   

 

 2016                                         Ot                    V10.3
                    HX OF BREAST MALIGNANCY                                     

 

 2016                                         Ot                    
V58.69                    OTH MED,LT,CURRENT USE                               
      

 

 2016                                         Ot                    V67.1
                    RADIOTHERAPY FOLLOW-UP                                     

 

 2016                                         Ot                    V67.2
                    CHEMOTHERAPY FOLLOW-UP                                     

 

 2016                                         Ot                    355.8
                    MONONEURITIS LEG NOS                                     

 

 2016                                         Ot                    
724.00                    SPINAL STENOSIS NOS                                  
   

 

 2016                                         Ot                    V10.3
                    HX OF BREAST MALIGNANCY                                     

 

 2016                                         Ot                    
V58.69                    OTH MED,LT,CURRENT USE                               
      

 

 2016                                         Ot                    V67.1
                    RADIOTHERAPY FOLLOW-UP                                     

 

 2016                                         Ot                    V67.2
                    CHEMOTHERAPY FOLLOW-UP                                     

 

 2016                                         Ot                    
V76.12                    OTH SCREEN MAMMO-MALIGN NEOPLASM OF MUNDO             
                        

 

 2016                                         Ot                    355.8
                    MONONEURITIS LEG NOS                                     

 

 2016                                         Ot                    599.0
                    URIN TRACT INFECTION NOS                                   
  

 

 2016                                         Ot                    
719.41                    JOINT PAIN-SHLDER                                     

 

 2016                                         Ot                    
724.00                    SPINAL STENOSIS NOS                                  
   

 

 2016                                         Ot                    V10.3
                    HX OF BREAST MALIGNANCY                                     

 

 2016                                         Ot                    
V58.69                    OTH MED,LT,CURRENT USE                               
      

 

 2016                                         Ot                    V67.1
                    RADIOTHERAPY FOLLOW-UP                                     

 

 2016                                         Ot                    V67.2
                    CHEMOTHERAPY FOLLOW-UP                                     

 

 2016                                         Ot                    355.8
                    MONONEURITIS LEG NOS                                     

 

 2016                                         Ot                    
724.00                    SPINAL STENOSIS NOS                                  
   

 

 2016                                         Ot                    V10.3
                    HX OF BREAST MALIGNANCY                                     

 

 2016                                         Ot                    
V58.69                    OTH MED,LT,CURRENT USE                               
      

 

 2016                                         Ot                    V67.1
                    RADIOTHERAPY FOLLOW-UP                                     

 

 2016                                         Ot                    V67.2
                    CHEMOTHERAPY FOLLOW-UP                                     

 

 2016                                         Ot                    
724.00                    SPINAL STENOSIS NOS                                  
   

 

 2016                                         Ot                    V10.3
                    HX OF BREAST MALIGNANCY                                     

 

 2016                                         Ot                    
V58.69                    OTH MED,LT,CURRENT USE                               
      

 

 2016                                         Ot                    V67.1
                    RADIOTHERAPY FOLLOW-UP                                     

 

 2016                                         Ot                    V67.2
                    CHEMOTHERAPY FOLLOW-UP                                     

 

 2016                                         Ot                    174.9
                    MALIGN NEOPL BREAST NOS                                     

 

 2016                    LEELEE ROSALES                    Ot           
         250.00                    DIAB VIC WO COMPL, TYPE II OR UNSPEC TY    
                                 

 

 2016                    LEELEE ROSALES                    Ot           
         355.8                    MONONEURITIS LEG NOS                         
            

 

 2016                    LEELEE ROSALES N                    Ot           
         724.00                    SPINAL STENOSIS NOS                         
            

 

 2016                    LEELEE ROSALES                    Ot           
         V10.3                    HX OF BREAST MALIGNANCY                      
               

 

 2016                    LEELEE ROSALES                    Ot           
         V58.69                    OTH MED,LT,CURRENT USE                      
               

 

 2016                    LEELEE ROSALES                    Ot           
         V67.1                    RADIOTHERAPY FOLLOW-UP                       
              

 

 2016                    LEELEE ROSALES                    Ot           
         V67.2                    CHEMOTHERAPY FOLLOW-UP                       
              

 

 2016                    LEELEE ROSALES                    Ot           
         174.9                    MALIGN NEOPL BREAST NOS                      
               

 

 2016                    LEELEE ROSALES                    Ot           
         793.89                    OTH (ABN) FINDINGS ON RADIOLOGICAL EXAMI    
                                 

 

 2016                    LEELEE ROSALES                    Ot           
         V76.11                    SCRN MAMMO-HIGH RISK PT, MALIGNANT NEOPL    
                                 

 

 2016                    DANIA WALDRON ARNP                    Ot     
               174.9                    MALIGN NEOPL BREAST NOS                
                     

 

 2016                    DANIA WALDRON ARNP                    Ot     
               793.80                    UNSPEC ABNORMAL MAMMOGRAM             
                        

 

 2016                    BOBBY, BOBAN N                    Ot           
         250.00                    DIAB VIC WO COMPL, TYPE II OR UNSPEC TY    
                                 

 

 2016                    BOBBY, BOBAN N                    Ot           
         355.8                    MONONEURITIS LEG NOS                         
            

 

 2016                    BOBBY, BOBAN N                    Ot           
         724.00                    SPINAL STENOSIS NOS                         
            

 

 2016                    BOBBY, BOBAN N                    Ot           
         V10.3                    HX OF BREAST MALIGNANCY                      
               

 

 2016                    BOBBY, BOBAN N                    Ot           
         V58.69                    OTH MED,LT,CURRENT USE                      
               

 

 2016                    BOBBY, BOBAN N                    Ot           
         V67.1                    RADIOTHERAPY FOLLOW-UP                       
              

 

 2016                    BOBBY, BOBAN N                    Ot           
         V67.2                    CHEMOTHERAPY FOLLOW-UP                       
              

 

 2016                    BOBBY, BOBAN N                    Ot           
         174.9                    MALIGN NEOPL BREAST NOS                      
               

 

 2016                    BOBBY, BOBAN N                    Ot           
         250.00                    DIAB VIC WO COMPL, TYPE II OR UNSPEC TY    
                                 

 

 2016                    BOBBY, BOBAN N                    Ot           
         355.8                    MONONEURITIS LEG NOS                         
            

 

 2016                    BOBBY, BOBAN N                    Ot           
         724.00                    SPINAL STENOSIS NOS                         
            

 

 2016                    BOBBY, BOBAN N                    Ot           
         V10.3                    HX OF BREAST MALIGNANCY                      
               

 

 2016                    BOBBY, BOBAN N                    Ot           
         V58.69                    OTH MED,LT,CURRENT USE                      
               

 

 2016                    BOBBY, BOBAN N                    Ot           
         V67.1                    RADIOTHERAPY FOLLOW-UP                       
              

 

 2016                    BOBBY, BOBAN N                    Ot           
         V67.2                    CHEMOTHERAPY FOLLOW-UP                       
              

 

 2016                    BOBBY, BOBAN N                    Ot           
         174.9                    MALIGN NEOPL BREAST NOS                      
               

 

 2016                    BOBBY, BOBAN N                    Ot           
         174.9                    MALIGN NEOPL BREAST NOS                      
               

 

 2016                    BOBBY, BOBAN N                    Ot           
         250.00                    DIAB VCI WO COMPL, TYPE II OR UNSPEC TY    
                                 

 

 2016                    BOBBY, BOBAN N                    Ot           
         355.8                    MONONEURITIS LEG NOS                         
            

 

 2016                    BOBBY, BOBAN N                    Ot           
         724.00                    SPINAL STENOSIS NOS                         
            

 

 2016                    BOBBY, BOBAN N                    Ot           
         V10.3                    HX OF BREAST MALIGNANCY                      
               

 

 2016                    BOBBY, BOBAN N                    Ot           
         V58.69                    OTH MED,LT,CURRENT USE                      
               

 

 2016                    BOBBY, BOBAN N                    Ot           
         V67.1                    RADIOTHERAPY FOLLOW-UP                       
              

 

 2016                    BOBBY, BOBAN N                    Ot           
         V67.2                    CHEMOTHERAPY FOLLOW-UP                       
              

 

 2016                    LEELEE ROSALES                    Ot           
         174.9                    MALIGN NEOPL BREAST NOS                      
               

 

 2016                    LEELEE ROSALES                    Ot           
         174.9                    MALIGN NEOPL BREAST NOS                      
               

 

 2016                    PRACHI CARRASCO FAC, ALI FACP CCDS                    
Ot                    174.9                    MALIGN NEOPL BREAST NOS         
                            

 

 2016                    PRACHI CARRASCO FAC, ALI FACP CCDS                    
Ot                    250.00                    DIAB VIC WO COMPL, TYPE II OR 
UNSPEC TY                                     

 

 2016                    PRACHI CARRASCO FACC, ALI FACP CCDS                    
Ot                    272.4                    HYPERLIPIDEMIA NEC/NOS          
                           

 

 2016                    PRACHI CARRASCO FACC, ALI FACP CCDS                    
Ot                    278.00                    OBESITY, NOS                   
                  

 

 2016                    PRACHI CARRASCO FACC, ALI FACP CCDS                    
Ot                    401.9                    HYPERTENSION NOS                
                     

 

 2016                    PRACHI CARRASCO FAC, ALI FACP CCDS                    
Ot                    V85.30                    BODY MASS INDEX 30.0-30.9, 
ADULT                                     

 

 2016                    NURIS CARRASCO, [facility]ORTON                    Ot      
              V72.84                    EXAM PRE-OPERATIVE NOS                 
                    

 

 2016                    CHRISTOS LAWLER MD                    Ot      
              174.9                    MALIGN NEOPL BREAST NOS                 
                    

 

 2016                    CHRISTOS LAWLER MD                    Ot      
              V72.63                    PRE-PROCEDURAL LABORATORY EXAMINATION  
                                   

 

 2016                    CHRISTOS LAWLER MD                    Ot      
              V74.8                    SCREEN-BACTERIAL DIS NEC                
                     

 

 2016                    CHRISTOS LAWLER MD                    Ot      
              786.6                    CHEST SWELLING/MASS/LUMP                
                     

 

 2016                    CHRISTOS LAWLER MD                    Ot      
              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  
                                   

 

 2016                    LEELEE ROSALES                    Ot           
         174.9                    MALIGN NEOPL BREAST NOS                      
               

 

 2016                    LEELEE ROSALES                    Ot           
         V15.3                    HX OF IRRADIATION                            
         

 

 2016                    LEELEE ROSALES                    Ot           
         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       
                              

 

 2016                    LEELEE ROSALES                    Ot           
         V58.69                    OTH MED,LT,CURRENT USE                      
               

 

 2016                    LEELEE ROSALES                    Ot           
         V87.41                    PERSONAL HISTORY OF ANTINEOPLASTIC CHEMO    
                                 

 

 2016                    CHRISTOS LAWLER MD                    Ot      
              V10.3                    HX OF BREAST MALIGNANCY                 
                    

 

 2016                    CHRITSOS LAWLER MD                    Ot      
              V72.84                    EXAM PRE-OPERATIVE NOS                 
                    

 

 2016                    DANIA WALDRON ARNP                    Ot     
               C50.911                    MALIGNANT NEOPLASM OF UNSP SITE OF 
RIGHT                                     

 

 2016                    DANIA WALDRON ARNP                    Ot     
               E11.9                    TYPE 2 DIABETES MELLITUS WITHOUT 
COMPLIC                                     

 

 2016                    WALDRONDANIA SIMMONS ARNP                    Ot     
               E78.0                    PURE HYPERCHOLESTEROLEMIA              
                       

 

 2016                    WALDRONDANIA SIMMONS ARNP                    Ot     
               G62.9                    POLYNEUROPATHY, UNSPECIFIED            
                         

 

 2016                    WALDRONDANIA SIMMONS ARNP                    Ot     
               I10                    ESSENTIAL (PRIMARY) HYPERTENSION         
                            

 

 2016                    CHIVO DANIA SIMMONS ARNP                    Ot     
               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
                                     

 

 2016                    CHIVO DANIA SIMMONS ARNP                    Ot     
               Z79.899                    OTHER LONG TERM (CURRENT) DRUG 
THERAPY                                     

 

 2016                    CHIVO DANIA MARTINEZP                    Ot     
               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 
NIP                                     

 

 2016                    CHIVO DANIA SIMMONS ARNP                    Ot     
               Z92.21                    PERSONAL HISTORY OF ANTINEOPLASTIC 
CHEMO                                     

 

 2016                    CHIVO DANIA SIMMONS ARNP                    Ot     
               Z92.3                    PERSONAL HISTORY OF IRRADIATION        
                             

 

 2016                    BRUNILDA WALDRONBRANDON SIMMONS ARNP                    Ot     
               C50.111                    MALIGNANT NEOPLASM OF CENTRAL PORTION 
OF                                     

 

 2016                    BRUNILDA WALDRONBRANDON SIMMONS ARNP                    Ot     
               E11.40                    TYPE 2 DIABETES MELLITUS WITH DIABETIC 
N                                     

 

 2016                    CHIOV DANIA SIMMONS ARNP                    Ot     
               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
                                     

 

 2016                    CHIVO DANIA SIMMONS ARNP                    Ot     
               Z79.899                    OTHER LONG TERM (CURRENT) DRUG 
THERAPY                                     

 

 2016                    CHIVO DANIA SIMMONS ARNP                    Ot     
               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 
NIP                                     

 

 2016                    CHIVO DANIA SIMMONS ARNP                    Ot     
               C50.111                    MALIGNANT NEOPLASM OF CENTRAL PORTION 
OF                                     

 

 2016                    CHIVO DANIA SIMMONS ARNP                    Ot     
               E11.40                    TYPE 2 DIABETES MELLITUS WITH DIABETIC 
N                                     

 

 2016                    CHIVO DANIA SIMMONS ARNP                    Ot     
               E11.9                    TYPE 2 DIABETES MELLITUS WITHOUT 
COMPLIC                                     

 

 2016                    CHIVO DANIA SIMMONS ARNP                    Ot     
               E78.0                    PURE HYPERCHOLESTEROLEMIA              
                       

 

 2016                    CHIVO DANIA SIMMONS ARNP                    Ot     
               I10                    ESSENTIAL (PRIMARY) HYPERTENSION         
                            

 

 2016                    CHIVO DANIA SIMMONS ARNP                    Ot     
               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
                                     

 

 2016                    BRUNILDA WALDRONBRANDON SIMMONS ARNP                    Ot     
               Z79.899                    OTHER LONG TERM (CURRENT) DRUG 
THERAPY                                     

 

 2016                    DANIA WALDRON ARNP                    Ot     
               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 
NIP                                     

 

 2016                    DANIA WALDRONP                    Ot     
               C50.111                    MALIGNANT NEOPLASM OF CENTRAL PORTION 
OF                                     

 

 2016                    DANIA WALDRONP                    Ot     
               C50.911                    MALIGNANT NEOPLASM OF UNSP SITE OF 
RIGHT                                     

 

 2016                    DANIA WALDRON ARNP                    Ot     
               E11.40                    TYPE 2 DIABETES MELLITUS WITH DIABETIC 
N                                     

 

 2016                    DANIA WALDRON ARNP                    Ot     
               E11.9                    TYPE 2 DIABETES MELLITUS WITHOUT 
COMPLIC                                     

 

 2016                    DANIA WALDRON ARNP                    Ot     
               E78.0                    PURE HYPERCHOLESTEROLEMIA              
                       

 

 2016                    DANIA WALDRON ARNP                    Ot     
               G62.9                    POLYNEUROPATHY, UNSPECIFIED            
                         

 

 2016                    DANIA WALDRON ARNP                    Ot     
               I10                    ESSENTIAL (PRIMARY) HYPERTENSION         
                            

 

 2016                    DANIA WALDRONP                    Ot     
               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
                                     

 

 2016                    DANIA WALDRONP                    Ot     
               Z79.899                    OTHER LONG TERM (CURRENT) DRUG 
THERAPY                                     

 

 2016                    DANIA WALDRON ARNP                    Ot     
               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 
NIP                                     

 

 2016                    DANIA WALDRON ARNP                    Ot     
               Z92.21                    PERSONAL HISTORY OF ANTINEOPLASTIC 
CHEMO                                     

 

 2016                    DANIA WALDRON ARNP                    Ot     
               Z92.3                    PERSONAL HISTORY OF IRRADIATION        
                             

 

 2016                    BOBBYLEELEE MEDINA MALENA                    Ot           
         C50.111                    MALIGNANT NEOPLASM OF CENTRAL PORTION OF   
                                  

 

 2016                    BOBBY, LEELEE GUY                    Ot           
         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    
                                 

 

 2016                    BOBBY, LEELEE GUY                    Ot           
         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     
                                

 

 2016                    LEELEE ROSALES                    Ot           
         E78.0                    PURE HYPERCHOLESTEROLEMIA                    
                 

 

 2016                    LEELEE ROSALES                    Ot           
         I10                    ESSENTIAL (PRIMARY) HYPERTENSION               
                      

 

 2016                    BOBBY LEELEE GUY                    Ot           
         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               
                      

 

 2016                    LEELEE ROSALES                    Ot           
         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      
                               

 

 2016                    LEELEE ROSALES                    Ot           
         Z79.899                    OTHER LONG TERM (CURRENT) DRUG THERAPY     
                                

 

 2016                    LEELEE ROSALES N                    Ot           
         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    
                                 

 

 2016                    LEELEE ROSALES N                    Ot           
         Z92.21                    PERSONAL HISTORY OF ANTINEOPLASTIC CHEMO    
                                 

 

 2016                    LEELEE ROSALES N                    Ot           
         Z92.3                    PERSONAL HISTORY OF IRRADIATION              
                       

 

 2016                    LEELEE ROSALES                    Ot           
         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   
                                  

 

 2016                    LEELEE ROSALES N                    Ot           
         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     
                                

 

 2016                    LEELEE ROSALES N                    Ot           
         E78.0                    PURE HYPERCHOLESTEROLEMIA                    
                 

 

 2016                    LEELEE ROSALES N                    Ot           
         G62.9                    POLYNEUROPATHY, UNSPECIFIED                  
                   

 

 2016                    LEELEE ROSALES N                    Ot           
         I10                    ESSENTIAL (PRIMARY) HYPERTENSION               
                      

 

 2016                    LEELEE ROSALES N                    Ot           
         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      
                               

 

 2016                    LEELEE ROSALES N                    Ot           
         Z79.899                    OTHER LONG TERM (CURRENT) DRUG THERAPY     
                                

 

 2016                    LEELEE ROSALES N                    Ot           
         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    
                                 

 

 2016                    LEELEE ROSALES N                    Ot           
         Z92.21                    PERSONAL HISTORY OF ANTINEOPLASTIC CHEMO    
                                 

 

 2016                    LEELEE ROSALES N                    Ot           
         Z92.3                    PERSONAL HISTORY OF IRRADIATION              
                       

 

 2016                    LEELEE ROSALES MALENA                    Ot           
         C50.111                    MALIGNANT NEOPLASM OF CENTRAL PORTION OF   
                                  

 

 2016                    LEELEE ROSALES N                    Ot           
         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    
                                 

 

 2016                    LEELEE ROSALES N                    Ot           
         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     
                                

 

 2016                    LEELEE ROSALES N                    Ot           
         E78.0                    PURE HYPERCHOLESTEROLEMIA                    
                 

 

 2016                    LEELEE ROSALES N                    Ot           
         I10                    ESSENTIAL (PRIMARY) HYPERTENSION               
                      

 

 2016                    LEELEE ROSALES N                    Ot           
         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               
                      

 

 2016                    LEELEE ROSALES N                    Ot           
         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      
                               

 

 2016                    LEELEE ROSALES N                    Ot           
         Z79.899                    OTHER LONG TERM (CURRENT) DRUG THERAPY     
                                

 

 2016                    LEELEE ROSALES N                    Ot           
         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    
                                 

 

 2016                    LEELEE ROSALES N                    Ot           
         Z92.21                    PERSONAL HISTORY OF ANTINEOPLASTIC CHEMO    
                                 

 

 2016                    LEELEE ROSALES                    Ot           
         Z92.3                    PERSONAL HISTORY OF IRRADIATION              
                       

 

 2016                    [facility]                    
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 
PORTION OF                                     

 

 2016                    PRACHI CARRASCO FAC, ALI [facility] CCDS                    
Ot                    E11.9                    TYPE 2 DIABETES MELLITUS WITHOUT 
COMPLIC                                     

 

 2016                    PRACHI CARRASCO [facility], ALI [facility] CCDS                    
Ot                    E78.0                    PURE HYPERCHOLESTEROLEMIA       
                              

 

 2016                    PRACHI CARRASCO [facility], ALI [facility] CCDS                    
Ot                    I10                    ESSENTIAL (PRIMARY) HYPERTENSION  
                                   

 

 2016                    LEELEE ROSALES                    Ot           
         C50.111                    MALIGNANT NEOPLASM OF CENTRAL PORTION OF   
                                  

 

 2016                    LEELEE ROSALES                    Ot           
         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    
                                 

 

 2016                    LEELEE ROSALES MALENA                    Ot           
         E78.0                    PURE HYPERCHOLESTEROLEMIA                    
                 

 

 2016                    LEELEE ROSALES MALENA                    Ot           
         I10                    ESSENTIAL (PRIMARY) HYPERTENSION               
                      

 

 2016                    LEELEE ROSALES                    Ot           
         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               
                      

 

 2016                    LEELEE ROSALES MALENA                    Ot           
         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      
                               

 

 2016                    LEELEE ROSALES MALENA                    Ot           
         Z79.899                    OTHER LONG TERM (CURRENT) DRUG THERAPY     
                                

 

 2016                    LEELEE ROSALES MALENA                    Ot           
         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    
                                 

 

 2016                    LEELEE ROSALES                    Ot           
         Z92.21                    PERSONAL HISTORY OF ANTINEOPLASTIC CHEMO    
                                 

 

 2016                    LEELEE ROSALES MALENA                    Ot           
         Z92.3                    PERSONAL HISTORY OF IRRADIATION              
                       

 

 2016                    CHRISTOS LAWLER MD                    Ot      
              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
                                      

 

 2016                    CHRISTOS LAWLER MD                    Ot      
              K63.5                    POLYP OF COLON                          
           

 

 2016                    CHRISTOS LAWLER MD                    Ot      
              Z09                    ENCNTR FOR F/U EXAM AFT TRTMT FOR COND O  
                                   

 

 2016                    CHRISTOS LAWLER MD                    Ot      
              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
                                      

 

 2016                    CHRISTOS LAWLER MD                    Ot      
              K63.5                    POLYP OF COLON                          
           

 

 2016                    CHRISTOS LAWLER MD                    Ot      
              Z09                    ENCNTR FOR F/U EXAM AFT TRTMT FOR COND O  
                                   

 

 2016                    NURIS CARRASCO, [facility]ON                    Ot      
              Z01.818                    ENCOUNTER FOR OTHER PREPROCEDURAL 
EXAMIN                                     

 

 2016                    NURIS CARRASCO, CHRISTOS NORTON                    Ot      
              Z86.010                    PERSONAL HISTORY OF COLONIC POLYPS    
                                 

 

 2016                    LEELEE ROSALES N                    Ot           
         C50.111                    MALIGNANT NEOPLASM OF CENTRAL PORTION OF   
                                  

 

 2016                    LEELEE ROSALES N                    Ot           
         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    
                                 

 

 2016                    LEELEE ROSALES N                    Ot           
         E78.0                    PURE HYPERCHOLESTEROLEMIA                    
                 

 

 2016                    LEELEE ROSALES N                    Ot           
         I10                    ESSENTIAL (PRIMARY) HYPERTENSION               
                      

 

 2016                    LEELEE ROSALES N                    Ot           
         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               
                      

 

 2016                    BOBBYLEELEE MEDINA N                    Ot           
         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      
                               

 

 2016                    LEELEE ROSALES N                    Ot           
         Z79.899                    OTHER LONG TERM (CURRENT) DRUG THERAPY     
                                

 

 2016                    BOBBYLEELEE MEDINA N                    Ot           
         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    
                                 

 

 2016                    LEELEE ROSALES N                    Ot           
         Z92.21                    PERSONAL HISTORY OF ANTINEOPLASTIC CHEMO    
                                 

 

 2016                    LEELEE ROSALES N                    Ot           
         Z92.3                    PERSONAL HISTORY OF IRRADIATION              
                       

 

 2016                    LEELEE ROSALES N                    Ot           
         C50.111                    MALIGNANT NEOPLASM OF CENTRAL PORTION OF   
                                  

 

 2016                    LEELEE ROSALES N                    Ot           
         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    
                                 

 

 2016                    LEELEE ROSALES N                    Ot           
         E78.00                    PURE HYPERCHOLESTEROLEMIA, UNSPECIFIED      
                               

 

 2016                    LEELEE ROSALES N                    Ot           
         G62.9                    POLYNEUROPATHY, UNSPECIFIED                  
                   

 

 2016                    LEELEE ROSALES N                    Ot           
         I10                    ESSENTIAL (PRIMARY) HYPERTENSION               
                      

 

 2016                    LEELEE ROSALES N                    Ot           
         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               
                      

 

 2016                    BOBBYLEELEE N                    Ot           
         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      
                               

 

 2016                    BOBBYLEELEE N                    Ot           
         Z79.899                    OTHER LONG TERM (CURRENT) DRUG THERAPY     
                                

 

 2016                    BOBBYLEELEE N                    Ot           
         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    
                                 

 

 2016                    BOBBYLEELEE N                    Ot           
         Z92.21                    PERSONAL HISTORY OF ANTINEOPLASTIC CHEMO    
                                 

 

 2016                    BOBBY, BOBAN N                    Ot           
         Z92.3                    PERSONAL HISTORY OF IRRADIATION              
                       

 

 2016                                         Ot                    355.8
                    MONONEURITIS LEG NOS                                     

 

 2016                                         Ot                    
724.00                    SPINAL STENOSIS NOS                                  
   

 

 2016                                         Ot                    V10.3
                    HX OF BREAST MALIGNANCY                                     

 

 2016                                         Ot                    
V58.69                    OTH MED,LT,CURRENT USE                               
      

 

 2016                                         Ot                    V67.1
                    RADIOTHERAPY FOLLOW-UP                                     

 

 2016                                         Ot                    V67.2
                    CHEMOTHERAPY FOLLOW-UP                                     

 

 2016                                         Ot                    355.8
                    MONONEURITIS LEG NOS                                     

 

 2016                                         Ot                    599.0
                    URIN TRACT INFECTION NOS                                   
  

 

 2016                                         Ot                    
719.41                    JOINT PAIN-SHLDER                                     

 

 2016                                         Ot                    
724.00                    SPINAL STENOSIS NOS                                  
   

 

 2016                                         Ot                    V10.3
                    HX OF BREAST MALIGNANCY                                     

 

 2016                                         Ot                    
V58.69                    OTH MED,LT,CURRENT USE                               
      

 

 2016                                         Ot                    V67.1
                    RADIOTHERAPY FOLLOW-UP                                     

 

 2016                                         Ot                    V67.2
                    CHEMOTHERAPY FOLLOW-UP                                     

 

 2016                                         Ot                    355.8
                    MONONEURITIS LEG NOS                                     

 

 2016                                         Ot                    
724.00                    SPINAL STENOSIS NOS                                  
   

 

 2016                                         Ot                    V10.3
                    HX OF BREAST MALIGNANCY                                     

 

 2016                                         Ot                    
V58.69                    OTH MED,LT,CURRENT USE                               
      

 

 2016                                         Ot                    V67.1
                    RADIOTHERAPY FOLLOW-UP                                     

 

 2016                                         Ot                    V67.2
                    CHEMOTHERAPY FOLLOW-UP                                     

 

 2016                                         Ot                    
724.00                    SPINAL STENOSIS NOS                                  
   

 

 2016                                         Ot                    V10.3
                    HX OF BREAST MALIGNANCY                                     

 

 2016                                         Ot                    
V58.69                    OTH MED,LT,CURRENT USE                               
      

 

 2016                                         Ot                    V67.1
                    RADIOTHERAPY FOLLOW-UP                                     

 

 2016                                         Ot                    V67.2
                    CHEMOTHERAPY FOLLOW-UP                                     

 

 2016                                         Ot                    174.9
                    MALIGN NEOPL BREAST NOS                                     

 

 2016                    LEELEE ROSALES                    Ot           
         250.00                    DIAB VIC WO COMPL, TYPE II OR UNSPEC TY    
                                 

 

 2016                    LEELEE ROSALES                    Ot           
         355.8                    MONONEURITIS LEG NOS                         
            

 

 2016                    LEELEE ROSALES                    Ot           
         724.00                    SPINAL STENOSIS NOS                         
            

 

 2016                    LEELEE ROSALES                    Ot           
         V10.3                    HX OF BREAST MALIGNANCY                      
               

 

 2016                    LEELEE ROSALES                    Ot           
         V58.69                    OTH MED,LT,CURRENT USE                      
               

 

 2016                    LEELEE ROSALES N                    Ot           
         V67.1                    RADIOTHERAPY FOLLOW-UP                       
              

 

 2016                    BOBBY, BOBAN N                    Ot           
         V67.2                    CHEMOTHERAPY FOLLOW-UP                       
              

 

 2016                    BOBBY BOBAN N                    Ot           
         174.9                    MALIGN NEOPL BREAST NOS                      
               

 

 2016                    LEELEE ROSALES N                    Ot           
         793.89                    OTH (ABN) FINDINGS ON RADIOLOGICAL EXAMI    
                                 

 

 2016                    LEELEE ROSALES N                    Ot           
         V76.11                    SCRN MAMMO-HIGH RISK PT, MALIGNANT NEOPL    
                                 

 

 2016                    WALDRONDANIA SIMMONS S ARNP                    Ot     
               174.9                    MALIGN NEOPL BREAST NOS                
                     

 

 2016                    WALDRONDANIA SIMMONS S ARNP                    Ot     
               793.80                    UNSPEC ABNORMAL MAMMOGRAM             
                        

 

 2016                    BOBBY BOBAN N                    Ot           
         250.00                    DIAB VIC WO COMPL, TYPE II OR UNSPEC TY    
                                 

 

 2016                    BOBBY, BOBAN N                    Ot           
         355.8                    MONONEURITIS LEG NOS                         
            

 

 2016                    BOBBY, BOBAN N                    Ot           
         724.00                    SPINAL STENOSIS NOS                         
            

 

 2016                    AMY ROSALESAN N                    Ot           
         V10.3                    HX OF BREAST MALIGNANCY                      
               

 

 2016                    LEELEE ROSALES N                    Ot           
         V58.69                    OTH MED,LT,CURRENT USE                      
               

 

 2016                    BOBBY BOBAN N                    Ot           
         V67.1                    RADIOTHERAPY FOLLOW-UP                       
              

 

 2016                    BOBBY BOBAN N                    Ot           
         V67.2                    CHEMOTHERAPY FOLLOW-UP                       
              

 

 2016                    BOBBY BOBAN N                    Ot           
         174.9                    MALIGN NEOPL BREAST NOS                      
               

 

 2016                    BOBBY BOBAN N                    Ot           
         250.00                    DIAB VIC WO COMPL, TYPE II OR UNSPEC TY    
                                 

 

 2016                    BOBBY BOBAN N                    Ot           
         355.8                    MONONEURITIS LEG NOS                         
            

 

 2016                    BOBBY BOBAN N                    Ot           
         724.00                    SPINAL STENOSIS NOS                         
            

 

 2016                    BOBBY BOBAN N                    Ot           
         V10.3                    HX OF BREAST MALIGNANCY                      
               

 

 2016                    BOBBY BOBAN N                    Ot           
         V58.69                    OTH MED,LT,CURRENT USE                      
               

 

 2016                    BOBBY BOBAN N                    Ot           
         V67.1                    RADIOTHERAPY FOLLOW-UP                       
              

 

 2016                    BOBBY BOBAN N                    Ot           
         V67.2                    CHEMOTHERAPY FOLLOW-UP                       
              

 

 2016                    BOBBY BOBAN N                    Ot           
         174.9                    MALIGN NEOPL BREAST NOS                      
               

 

 2016                    LEELEE ROSALES N                    Ot           
         174.9                    MALIGN NEOPL BREAST NOS                      
               

 

 2016                    LEELEE ROSALES N                    Ot           
         250.00                    DIAB VIC WO COMPL, TYPE II OR UNSPEC TY    
                                 

 

 2016                    BOBBY AMYKATE N                    Ot           
         355.8                    MONONEURITIS LEG NOS                         
            

 

 2016                    LEELEE ROSALES MALENA                    Ot           
         724.00                    SPINAL STENOSIS NOS                         
            

 

 2016                    LEELEE ROSALES MALENA                    Ot           
         V10.3                    HX OF BREAST MALIGNANCY                      
               

 

 2016                    LEELEE ROSALES MALENA                    Ot           
         V58.69                    OTH MED,LT,CURRENT USE                      
               

 

 2016                    LEELEE ROSALES MALENA                    Ot           
         V67.1                    RADIOTHERAPY FOLLOW-UP                       
              

 

 2016                    BOBBY LEELEE N                    Ot           
         V67.2                    CHEMOTHERAPY FOLLOW-UP                       
              

 

 2016                    BOBBY AMYKATE MALENA                    Ot           
         174.9                    MALIGN NEOPL BREAST NOS                      
               

 

 2016                    LEELEE ROSALES MALENA                    Ot           
         174.9                    MALIGN NEOPL BREAST NOS                      
               

 

 2016                    PRACHI CARRASCO FACC, ALI FACP CCDS                    
Ot                    174.9                    MALIGN NEOPL BREAST NOS         
                            

 

 2016                    PRACHI CARRASCO FACC, ALI FACP CCDS                    
Ot                    250.00                    DIAB VIC WO COMPL, TYPE II OR 
UNSPEC TY                                     

 

 2016                    PRACHI CARRASCO FACC, ALI FACP CCDS                    
Ot                    272.4                    HYPERLIPIDEMIA NEC/NOS          
                           

 

 2016                    PRACHI CARRASCO FACC, ALI FACP CCDS                    
Ot                    278.00                    OBESITY, NOS                   
                  

 

 2016                    PRACHI CARRASCO FACC, ALI FACP CCDS                    
Ot                    401.9                    HYPERTENSION NOS                
                     

 

 2016                    PRACHI CARRASCO FACC, ALI FACP CCDS                    
Ot                    V85.30                    BODY MASS INDEX 30.0-30.9, 
ADULT                                     

 

 2016                    NURIS CARRASCO, CHRISTOS NORTON                    Ot      
              V72.84                    EXAM PRE-OPERATIVE NOS                 
                    

 

 2016                    NURIS CARRASCO, CHRISTOS NORTON                    Ot      
              174.9                    MALIGN NEOPL BREAST NOS                 
                    

 

 2016                    CHRISTOS LAWLER MD                    Ot      
              V72.63                    PRE-PROCEDURAL LABORATORY EXAMINATION  
                                   

 

 2016                    CHRISTOS LAWLER MD                    Ot      
              V74.8                    SCREEN-BACTERIAL DIS NEC                
                     

 

 2016                    CHRISTOS LAWLER MD                    Ot      
              786.6                    CHEST SWELLING/MASS/LUMP                
                     

 

 2016                    CHRISTOS LAWLER MD                    Ot      
              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  
                                   

 

 2016                    LEELEE ROSALES                    Ot           
         174.9                    MALIGN NEOPL BREAST NOS                      
               

 

 2016                    LEELEE ROSALES                    Ot           
         V15.3                    HX OF IRRADIATION                            
         

 

 2016                    LEELEE ROSALES                    Ot           
         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       
                              

 

 2016                    LEEELE ROSALES                    Ot           
         V58.69                    OTH MED,LT,CURRENT USE                      
               

 

 2016                    LEELEE ROSALES                    Ot           
         V87.41                    PERSONAL HISTORY OF ANTINEOPLASTIC CHEMO    
                                 

 

 2016                    NURIS CARRASCO, CHRISTOS NORTON                    Ot      
              V10.3                    HX OF BREAST MALIGNANCY                 
                    

 

 2016                    NURIS CARRASCO, CHRISTOS NORTON                    Ot      
              V72.84                    EXAM PRE-OPERATIVE NOS                 
                    

 

 2016                    DANIA WALDRON ARNP                    Ot     
               C50.911                    MALIGNANT NEOPLASM OF UNSP SITE OF 
RIGHT                                     

 

 2016                    DANIA WALDRON ARNP                    Ot     
               E11.9                    TYPE 2 DIABETES MELLITUS WITHOUT 
COMPLIC                                     

 

 2016                    DANIA WALDRONP                    Ot     
               E78.0                    PURE HYPERCHOLESTEROLEMIA              
                       

 

 2016                    DANIA WALDRON ARNP                    Ot     
               G62.9                    POLYNEUROPATHY, UNSPECIFIED            
                         

 

 2016                    DANIA WALDRON ARNP                    Ot     
               I10                    ESSENTIAL (PRIMARY) HYPERTENSION         
                            

 

 2016                    DANIA WALDRON ARNP                    Ot     
               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
                                     

 

 2016                    DANIA WALDRON ARNP                    Ot     
               Z79.899                    OTHER LONG TERM (CURRENT) DRUG 
THERAPY                                     

 

 2016                    DANIA WALDRON ARNP                    Ot     
               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 
NIP                                     

 

 2016                    DANIA WALDRON ARNP                    Ot     
               Z92.21                    PERSONAL HISTORY OF ANTINEOPLASTIC 
CHEMO                                     

 

 2016                    DANIA WALDRON ARNP                    Ot     
               Z92.3                    PERSONAL HISTORY OF IRRADIATION        
                             

 

 2016                    DANIA WALDRON ARNP                    Ot     
               C50.111                    MALIGNANT NEOPLASM OF CENTRAL PORTION 
OF                                     

 

 2016                    DANIA WALDRON ARNP                    Ot     
               E11.40                    TYPE 2 DIABETES MELLITUS WITH DIABETIC 
N                                     

 

 2016                    DANIA WALDRON ARNP                    Ot     
               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
                                     

 

 2016                    DANIA WALDRON ARNP                    Ot     
               Z79.899                    OTHER LONG TERM (CURRENT) DRUG 
THERAPY                                     

 

 2016                    DANIA WALDRON ARNP                    Ot     
               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 
NIP                                     

 

 2016                    WALDRONDANIA SIMMONS ARNP                    Ot     
               C50.111                    MALIGNANT NEOPLASM OF CENTRAL PORTION 
OF                                     

 

 2016                    DANIA WALDRON ARNP                    Ot     
               E11.40                    TYPE 2 DIABETES MELLITUS WITH DIABETIC 
N                                     

 

 2016                    DANIA WALDRON ARNP                    Ot     
               E11.9                    TYPE 2 DIABETES MELLITUS WITHOUT 
COMPLIC                                     

 

 2016                    WALDRONDANIA SIMMONS ARNP                    Ot     
               E78.0                    PURE HYPERCHOLESTEROLEMIA              
                       

 

 2016                    WALDRONDANIA SIMMONS ARNP                    Ot     
               I10                    ESSENTIAL (PRIMARY) HYPERTENSION         
                            

 

 2016                    WALDRONDANIA SIMMONS ARNP                    Ot     
               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
                                     

 

 2016                    WALDRONDANIA SIMMONS ARNP                    Ot     
               Z79.899                    OTHER LONG TERM (CURRENT) DRUG 
THERAPY                                     

 

 2016                    CHIVODANIA ARNP                    Ot     
               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 
NIP                                     

 

 2016                    BRUNILDA WALDRONBRANDON SIMMONS ARNP                    Ot     
               C50.111                    MALIGNANT NEOPLASM OF CENTRAL PORTION 
OF                                     

 

 2016                    WALDRONDANIA SIMMONS ARNP                    Ot     
               C50.911                    MALIGNANT NEOPLASM OF UNSP SITE OF 
RIGHT                                     

 

 2016                    WALDRONDANIA SIMMONS ARNP                    Ot     
               E11.40                    TYPE 2 DIABETES MELLITUS WITH DIABETIC 
N                                     

 

 2016                    WALDRONDANIA SIMMONS ARNP                    Ot     
               E11.9                    TYPE 2 DIABETES MELLITUS WITHOUT 
COMPLIC                                     

 

 2016                    WALDRONDANIA SIMMONS ARNP                    Ot     
               E78.0                    PURE HYPERCHOLESTEROLEMIA              
                       

 

 2016                    WALDRONDANIA SIMMONS ARNP                    Ot     
               G62.9                    POLYNEUROPATHY, UNSPECIFIED            
                         

 

 2016                    WALDRON DANIA SIMMONS ARNP                    Ot     
               I10                    ESSENTIAL (PRIMARY) HYPERTENSION         
                            

 

 2016                    CHIVO DANIA SIMMONS ARNP                    Ot     
               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
                                     

 

 2016                    CHIVO DANIA SIMMONS ARNP                    Ot     
               Z79.899                    OTHER LONG TERM (CURRENT) DRUG 
THERAPY                                     

 

 2016                    CHIVO DANIA SIMMONS ARNP                    Ot     
               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 
NIP                                     

 

 2016                    DANIA WALDRON S ARNP                    Ot     
               Z92.21                    PERSONAL HISTORY OF ANTINEOPLASTIC 
CHEMO                                     

 

 2016                    DANIA WALDRON S ARNP                    Ot     
               Z92.3                    PERSONAL HISTORY OF IRRADIATION        
                             

 

 2016                    LEELEE ROSALES                    Ot           
         C50.111                    MALIGNANT NEOPLASM OF CENTRAL PORTION OF   
                                  

 

 2016                    LEELEE ROSALES N                    Ot           
         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    
                                 

 

 2016                    LEELEE ROSALES N                    Ot           
         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     
                                

 

 2016                    LEELEE ROSALES N                    Ot           
         E78.0                    PURE HYPERCHOLESTEROLEMIA                    
                 

 

 2016                    LEELEE ROSALES N                    Ot           
         I10                    ESSENTIAL (PRIMARY) HYPERTENSION               
                      

 

 2016                    LEELEE ROSALES N                    Ot           
         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               
                      

 

 2016                    LEELEE ROSALES N                    Ot           
         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      
                               

 

 2016                    LEELEE ROSALES N                    Ot           
         Z79.899                    OTHER LONG TERM (CURRENT) DRUG THERAPY     
                                

 

 2016                    LEELEE ROSALES N                    Ot           
         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    
                                 

 

 2016                    LEELEE ROSALES N                    Ot           
         Z92.21                    PERSONAL HISTORY OF ANTINEOPLASTIC CHEMO    
                                 

 

 2016                    LEELEE ROSALES N                    Ot           
         Z92.3                    PERSONAL HISTORY OF IRRADIATION              
                       

 

 2016                    LEELEE ROSALES N                    Ot           
         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   
                                  

 

 2016                    LEELEE ROSALES N                    Ot           
         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     
                                

 

 2016                    LEELEE ROSALES N                    Ot           
         E78.0                    PURE HYPERCHOLESTEROLEMIA                    
                 

 

 2016                    LEELEE ROSALES N                    Ot           
         G62.9                    POLYNEUROPATHY, UNSPECIFIED                  
                   

 

 2016                    LEELEE ROSALES N                    Ot           
         I10                    ESSENTIAL (PRIMARY) HYPERTENSION               
                      

 

 2016                    LEELEE ROSALES N                    Ot           
         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      
                               

 

 2016                    LEELEE ROSALES N                    Ot           
         Z79.899                    OTHER LONG TERM (CURRENT) DRUG THERAPY     
                                

 

 2016                    LEELEE ROSALES N                    Ot           
         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    
                                 

 

 2016                    LEELEE ROSALES N                    Ot           
         Z92.21                    PERSONAL HISTORY OF ANTINEOPLASTIC CHEMO    
                                 

 

 2016                    LEELEE ROSALES N                    Ot           
         Z92.3                    PERSONAL HISTORY OF IRRADIATION              
                       

 

 2016                    LEELEE ROSALES N                    Ot           
         C50.111                    MALIGNANT NEOPLASM OF CENTRAL PORTION OF   
                                  

 

 2016                    LEELEE ROSALES N                    Ot           
         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    
                                 

 

 2016                    LEELEE ROSALES N                    Ot           
         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     
                                

 

 2016                    LEELEE ROSALES N                    Ot           
         E78.0                    PURE HYPERCHOLESTEROLEMIA                    
                 

 

 2016                    LEELEE ROSALES N                    Ot           
         I10                    ESSENTIAL (PRIMARY) HYPERTENSION               
                      

 

 2016                    LEELEE ROSALES N                    Ot           
         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               
                      

 

 2016                    LEELEE ROSALES N                    Ot           
         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      
                               

 

 2016                    LEELEE ROSALES N                    Ot           
         Z79.899                    OTHER LONG TERM (CURRENT) DRUG THERAPY     
                                

 

 2016                    LEELEE ROSALES N                    Ot           
         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    
                                 

 

 2016                    LEELEE ROSALES N                    Ot           
         Z92.21                    PERSONAL HISTORY OF ANTINEOPLASTIC CHEMO    
                                 

 

 2016                    LEELEE RSOALES N                    Ot           
         Z92.3                    PERSONAL HISTORY OF IRRADIATION              
                       

 

 2016                    PRACHI CARRASCO FACC, ALI FACP CCDS                    
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 
PORTION OF                                     

 

 2016                    PRACHI CARRASCO FACC, ALI FACP CCDS                    
Ot                    E11.9                    TYPE 2 DIABETES MELLITUS WITHOUT 
COMPLIC                                     

 

 2016                    PRACHI CARRASCO FACC, ALI FACP CCDS                    
Ot                    E78.0                    PURE HYPERCHOLESTEROLEMIA       
                              

 

 2016                    PRACHI CARRASCO FACC, ALI FACP CCDS                    
Ot                    I10                    ESSENTIAL (PRIMARY) HYPERTENSION  
                                   

 

 2016                    LEELEE ROSALES N                    Ot           
         C50.111                    MALIGNANT NEOPLASM OF CENTRAL PORTION OF   
                                  

 

 2016                    LEELEE ROSALES N                    Ot           
         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    
                                 

 

 2016                    LEELEE ROSALES N                    Ot           
         E78.0                    PURE HYPERCHOLESTEROLEMIA                    
                 

 

 2016                    LEELEE ROSALES N                    Ot           
         I10                    ESSENTIAL (PRIMARY) HYPERTENSION               
                      

 

 2016                    LEELEE ROSALES N                    Ot           
         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               
                      

 

 2016                    LEELEE ROSALES N                    Ot           
         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      
                               

 

 2016                    LEELEE ROSALES N                    Ot           
         Z79.899                    OTHER LONG TERM (CURRENT) DRUG THERAPY     
                                

 

 2016                    LEELEE ROSALES N                    Ot           
         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    
                                 

 

 2016                    LEELEE ROSALES N                    Ot           
         Z92.21                    PERSONAL HISTORY OF ANTINEOPLASTIC CHEMO    
                                 

 

 2016                    LEELEE ROSALES N                    Ot           
         Z92.3                    PERSONAL HISTORY OF IRRADIATION              
                       

 

 2016                    LEELEE ROSALES N                    Ot           
         C50.111                    MALIGNANT NEOPLASM OF CENTRAL PORTION OF   
                                  

 

 2016                    LEELEE ROSALES N                    Ot           
         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    
                                 

 

 2016                    LEELEE ROSALES N                    Ot           
         E78.00                    PURE HYPERCHOLESTEROLEMIA, UNSPECIFIED      
                               

 

 2016                    LEELEE ROSALES N                    Ot           
         G62.9                    POLYNEUROPATHY, UNSPECIFIED                  
                   

 

 2016                    LEELEE ROSALES N                    Ot           
         I10                    ESSENTIAL (PRIMARY) HYPERTENSION               
                      

 

 2016                    LEELEE ROSALES N                    Ot           
         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               
                      

 

 2016                    LEELEE ROSALES N                    Ot           
         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      
                               

 

 2016                    LEELEE ROSALES N                    Ot           
         Z79.899                    OTHER LONG TERM (CURRENT) DRUG THERAPY     
                                

 

 2016                    LEELEE ROSALES N                    Ot           
         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    
                                 

 

 2016                    LEELEE ROSALES N                    Ot           
         Z92.21                    PERSONAL HISTORY OF ANTINEOPLASTIC CHEMO    
                                 

 

 2016                    LEELEE ROSALES N                    Ot           
         Z92.3                    PERSONAL HISTORY OF IRRADIATION              
                       

 

 2016                    LEELEE ROSALES N                    Ot           
         Z12.31                    ENCNTR SCREEN MAMMOGRAM FOR MALIGNANT NE    
                                 

 

 2016                    LEELEE ROSALES N                    Ot           
         Z12.31                    ENCNTR SCREEN MAMMOGRAM FOR MALIGNANT NE    
                                 

 

 2016                    LEELEE ROSALES N                    Ot           
         Z85.3                    PERSONAL HISTORY OF MALIGNANT NEOPLASM O     
                                

 

 2016                    LEELEE ROSALES                    Ot           
         C50.111                    MALIGNANT NEOPLASM OF CENTRAL PORTION OF   
                                  

 

 2016                    LEELEE ROSALES                    Ot           
         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    
                                 

 

 2016                    LEELEE ROSALES N                    Ot           
         E78.00                    PURE HYPERCHOLESTEROLEMIA, UNSPECIFIED      
                               

 

 2016                    LEELEE ROSALES N                    Ot           
         G62.9                    POLYNEUROPATHY, UNSPECIFIED                  
                   

 

 2016                    LEELEE ROSALES N                    Ot           
         I10                    ESSENTIAL (PRIMARY) HYPERTENSION               
                      

 

 2016                    LEELEE ROSALES N                    Ot           
         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               
                      

 

 2016                    LEELEE ROSALES N                    Ot           
         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      
                               

 

 2016                    LEELEE ROSALES N                    Ot           
         Z79.899                    OTHER LONG TERM (CURRENT) DRUG THERAPY     
                                

 

 2016                    LEELEE ROSALES                    Ot           
         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    
                                 

 

 2016                    LEELEE ROSALES                    Ot           
         Z92.21                    PERSONAL HISTORY OF ANTINEOPLASTIC CHEMO    
                                 

 

 2016                    LEELEE ROSALES                    Ot           
         Z92.3                    PERSONAL HISTORY OF IRRADIATION              
                       

 

 2017                    LEELEE ROSALES                    Ot           
         Z12.31                    ENCNTR SCREEN MAMMOGRAM FOR MALIGNANT NE    
                                 

 

 2017                    LEELEE ROSALES                    Ot           
         Z85.3                    PERSONAL HISTORY OF MALIGNANT NEOPLASM O     
                                

 

 2017                    LEELEE ROSALES                    Ot           
         C50.111                    MALIGNANT NEOPLASM OF CENTRAL PORTION OF   
                                  

 

 2017                    LEELEE ROSALES                    Ot           
         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    
                                 

 

 2017                    LEELEE ROSALES                    Ot           
         E78.00                    PURE HYPERCHOLESTEROLEMIA, UNSPECIFIED      
                               

 

 2017                    LEELEE ROSALES                    Ot           
         G62.9                    POLYNEUROPATHY, UNSPECIFIED                  
                   

 

 2017                    LEELEE ROSALES                    Ot           
         I10                    ESSENTIAL (PRIMARY) HYPERTENSION               
                      

 

 2017                    LEELEE ROSALES                    Ot           
         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               
                      

 

 2017                    LEELEE ROSALES                    Ot           
         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      
                               

 

 2017                    LEELEE ROSALES                    Ot           
         Z79.899                    OTHER LONG TERM (CURRENT) DRUG THERAPY     
                                

 

 2017                    LEELEE ROSALES                    Ot           
         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    
                                 

 

 2017                    LEELEE ROSALES                    Ot           
         Z92.21                    PERSONAL HISTORY OF ANTINEOPLASTIC CHEMO    
                                 

 

 2017                    LEELEE ROSALES                    Ot           
         Z92.3                    PERSONAL HISTORY OF IRRADIATION              
                       

 

 06/15/2017                                         Ot                    355.8
                    MONONEURITIS LEG NOS                                     

 

 06/15/2017                                         Ot                    
724.00                    SPINAL STENOSIS NOS                                  
   

 

 06/15/2017                                         Ot                    V10.3
                    HX OF BREAST MALIGNANCY                                     

 

 06/15/2017                                         Ot                    
V58.69                    OTH MED,LT,CURRENT USE                               
      

 

 06/15/2017                                         Ot                    V67.1
                    RADIOTHERAPY FOLLOW-UP                                     

 

 06/15/2017                                         Ot                    V67.2
                    CHEMOTHERAPY FOLLOW-UP                                     

 

 06/15/2017                                         Ot                    
724.00                    SPINAL STENOSIS NOS                                  
   

 

 06/15/2017                                         Ot                    V10.3
                    HX OF BREAST MALIGNANCY                                     

 

 06/15/2017                                         Ot                    
V58.69                    OTH MED,LT,CURRENT USE                               
      

 

 06/15/2017                                         Ot                    V67.1
                    RADIOTHERAPY FOLLOW-UP                                     

 

 06/15/2017                                         Ot                    V67.2
                    CHEMOTHERAPY FOLLOW-UP                                     

 

 06/15/2017                                         Ot                    174.9
                    MALIGN NEOPL BREAST NOS                                     

 

 06/15/2017                    LEELEE ROSALES N                    Ot           
         250.00                    DIAB VIC WO COMPL, TYPE II OR UNSPEC TY    
                                 

 

 06/15/2017                    BOBBY BOBAN N                    Ot           
         355.8                    MONONEURITIS LEG NOS                         
            

 

 06/15/2017                    LEELEE ROSALES N                    Ot           
         724.00                    SPINAL STENOSIS NOS                         
            

 

 06/15/2017                    LEELEE ROSALES N                    Ot           
         V10.3                    HX OF BREAST MALIGNANCY                      
               

 

 06/15/2017                    LEELEE ROSALES N                    Ot           
         V58.69                    OTH MED,LT,CURRENT USE                      
               

 

 06/15/2017                    BOBBY BOBAN N                    Ot           
         V67.1                    RADIOTHERAPY FOLLOW-UP                       
              

 

 06/15/2017                    LEELEE ROSALES N                    Ot           
         V67.2                    CHEMOTHERAPY FOLLOW-UP                       
              

 

 06/15/2017                    BOBBY BOBKATE N                    Ot           
         174.9                    MALIGN NEOPL BREAST NOS                      
               

 

 06/15/2017                    LEELEE ROSALES N                    Ot           
         793.89                    OTH (ABN) FINDINGS ON RADIOLOGICAL EXAMI    
                                 

 

 06/15/2017                    LEELEE ROSALES N                    Ot           
         V76.11                    SCRN MAMMO-HIGH RISK PT, MALIGNANT NEOPL    
                                 

 

 06/15/2017                    DANIA WALDRON ARNP                    Ot     
               174.9                    MALIGN NEOPL BREAST NOS                
                     

 

 06/15/2017                    DANIA WALDRON ARNP                    Ot     
               793.80                    UNSPEC ABNORMAL MAMMOGRAM             
                        

 

 06/15/2017                    LEELEE ROSALES N                    Ot           
         250.00                    DIAB VIC WO COMPL, TYPE II OR UNSPEC TY    
                                 

 

 06/15/2017                    BOBBY BOBAN N                    Ot           
         355.8                    MONONEURITIS LEG NOS                         
            

 

 06/15/2017                    BOBBY BOBKATE N                    Ot           
         724.00                    SPINAL STENOSIS NOS                         
            

 

 06/15/2017                    LEELEE ROSALES N                    Ot           
         V10.3                    HX OF BREAST MALIGNANCY                      
               

 

 06/15/2017                    LEELEE ROSALES N                    Ot           
         V58.69                    OTH MED,LT,CURRENT USE                      
               

 

 06/15/2017                    LEELEE ROSALES N                    Ot           
         V67.1                    RADIOTHERAPY FOLLOW-UP                       
              

 

 06/15/2017                    BOBBY BOBAN N                    Ot           
         V67.2                    CHEMOTHERAPY FOLLOW-UP                       
              

 

 06/15/2017                    BOBBY BOBAN N                    Ot           
         174.9                    MALIGN NEOPL BREAST NOS                      
               

 

 06/15/2017                    BOBBY BOBAN N                    Ot           
         250.00                    DIAB VIC WO COMPL, TYPE II OR UNSPEC TY    
                                 

 

 06/15/2017                    BOBBY BOBAN N                    Ot           
         355.8                    MONONEURITIS LEG NOS                         
            

 

 06/15/2017                    BOBBY BOBAN N                    Ot           
         724.00                    SPINAL STENOSIS NOS                         
            

 

 06/15/2017                    BOBBYAMYAN N                    Ot           
         V10.3                    HX OF BREAST MALIGNANCY                      
               

 

 06/15/2017                    BOBBYAMYAN N                    Ot           
         V58.69                    OTH MED,LT,CURRENT USE                      
               

 

 06/15/2017                    BOBBY BOBAN N                    Ot           
         V67.1                    RADIOTHERAPY FOLLOW-UP                       
              

 

 06/15/2017                    BOBBY BOBAN N                    Ot           
         V67.2                    CHEMOTHERAPY FOLLOW-UP                       
              

 

 06/15/2017                    BOBBY BOBAN N                    Ot           
         174.9                    MALIGN NEOPL BREAST NOS                      
               

 

 06/15/2017                    BOBBY BOBAN N                    Ot           
         174.9                    MALIGN NEOPL BREAST NOS                      
               

 

 06/15/2017                    BOBBY BOBAN N                    Ot           
         250.00                    DIAB VIC WO COMPL, TYPE II OR UNSPEC TY    
                                 

 

 06/15/2017                    BOBBY BOBAN N                    Ot           
         355.8                    MONONEURITIS LEG NOS                         
            

 

 06/15/2017                    BOBBY BOBAN N                    Ot           
         724.00                    SPINAL STENOSIS NOS                         
            

 

 06/15/2017                    BOBBY BOBAN N                    Ot           
         V10.3                    HX OF BREAST MALIGNANCY                      
               

 

 06/15/2017                    BOBBY BOBAN N                    Ot           
         V58.69                    OTH MED,LT,CURRENT USE                      
               

 

 06/15/2017                    BOBBY BOBAN N                    Ot           
         V67.1                    RADIOTHERAPY FOLLOW-UP                       
              

 

 06/15/2017                    BOBBY BOBAN N                    Ot           
         V67.2                    CHEMOTHERAPY FOLLOW-UP                       
              

 

 06/15/2017                    BOBBY BOBAN N                    Ot           
         174.9                    MALIGN NEOPL BREAST NOS                      
               

 

 06/15/2017                    BOBBY BOBAN N                    Ot           
         174.9                    MALIGN NEOPL BREAST NOS                      
               

 

 06/15/2017                    PRACHI CARRASCO FACC, ALI FACP CCDS                    
Ot                    174.9                    MALIGN NEOPL BREAST NOS         
                            

 

 06/15/2017                    PRACHI CARRASCO FACC, ALI FACP CCDS                    
Ot                    250.00                    DIAB VIC WO COMPL, TYPE II OR 
UNSPEC TY                                     

 

 06/15/2017                    PRACHI CARRASCO FACC, ALI FACP CCDS                    
Ot                    272.4                    HYPERLIPIDEMIA NEC/NOS          
                           

 

 06/15/2017                    PRACHI CARRASCO FACC, ALI FACP CCDS                    
Ot                    278.00                    OBESITY, NOS                   
                  

 

 06/15/2017                    PRACHI ACRRASCO FACC, ALI FACP CCDS                    
Ot                    401.9                    HYPERTENSION NOS                
                     

 

 06/15/2017                    PRACHI CARRASCO FACC, ALI FACP CCDS                    
Ot                    V85.30                    BODY MASS INDEX 30.0-30.9, 
ADULT                                     

 

 06/15/2017                    CHRISTOS LAWLER MD                    Ot      
              V72.84                    EXAM PRE-OPERATIVE NOS                 
                    

 

 06/15/2017                    CHRISTOS LAWLER MD                    Ot      
              174.9                    MALIGN NEOPL BREAST NOS                 
                    

 

 06/15/2017                    CHRISTOS LAWLER MD                    Ot      
              V72.63                    PRE-PROCEDURAL LABORATORY EXAMINATION  
                                   

 

 06/15/2017                    CHRISTOS LAWLER MD                    Ot      
              V74.8                    SCREEN-BACTERIAL DIS NEC                
                     

 

 06/15/2017                    CHRISTOS LAWLER MD                    Ot      
              786.6                    CHEST SWELLING/MASS/LUMP                
                     

 

 06/15/2017                    CHRISTOS LAWLER MD                    Ot      
              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  
                                   

 

 06/15/2017                    BOBBYLEELEE                    Ot           
         174.9                    MALIGN NEOPL BREAST NOS                      
               

 

 06/15/2017                    BOBBY LEELEE GUY                    Ot           
         V15.3                    HX OF IRRADIATION                            
         

 

 06/15/2017                    BOBBYLEELEE                    Ot           
         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       
                              

 

 06/15/2017                    BOBBY AMYKATE GUY                    Ot           
         V58.69                    OTH MED,LT,CURRENT USE                      
               

 

 06/15/2017                    BOBBYLEELEE                    Ot           
         V87.41                    PERSONAL HISTORY OF ANTINEOPLASTIC CHEMO    
                                 

 

 06/15/2017                    CHRISTOS LAWLER MD                    Ot      
              V10.3                    HX OF BREAST MALIGNANCY                 
                    

 

 06/15/2017                    CHRISTOS LAWLER MD                    Ot      
              V72.84                    EXAM PRE-OPERATIVE NOS                 
                    

 

 06/15/2017                    DANIA WALDRON ARNP                    Ot     
               C50.911                    MALIGNANT NEOPLASM OF UNSP SITE OF 
RIGHT                                     

 

 06/15/2017                    DANIA WALDRON ARNP                    Ot     
               E11.9                    TYPE 2 DIABETES MELLITUS WITHOUT 
COMPLIC                                     

 

 06/15/2017                    DANIA WALDRON ARNP                    Ot     
               E78.0                    PURE HYPERCHOLESTEROLEMIA              
                       

 

 06/15/2017                    DANIA WALDRON ARNP                    Ot     
               G62.9                    POLYNEUROPATHY, UNSPECIFIED            
                         

 

 06/15/2017                    DANIA WALDRON ARNP                    Ot     
               I10                    ESSENTIAL (PRIMARY) HYPERTENSION         
                            

 

 06/15/2017                    DANIA WALDRON ARNP                    Ot     
               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
                                     

 

 06/15/2017                    DANIA WALDRON ARNP                    Ot     
               Z79.899                    OTHER LONG TERM (CURRENT) DRUG 
THERAPY                                     

 

 06/15/2017                    DANIA WALDRON ARNP                    Ot     
               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 
NIP                                     

 

 06/15/2017                    DANIA WALDRON ARNP                    Ot     
               Z92.21                    PERSONAL HISTORY OF ANTINEOPLASTIC 
CHEMO                                     

 

 06/15/2017                    DANIA WALDRON ARNP                    Ot     
               Z92.3                    PERSONAL HISTORY OF IRRADIATION        
                             

 

 06/15/2017                    DANIA WALDRON ARNP                    Ot     
               C50.111                    MALIGNANT NEOPLASM OF CENTRAL PORTION 
OF                                     

 

 06/15/2017                    DANIA WALDRON ARNP                    Ot     
               E11.40                    TYPE 2 DIABETES MELLITUS WITH DIABETIC 
N                                     

 

 06/15/2017                    DANIA WALDRON ARNP                    Ot     
               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
                                     

 

 06/15/2017                    WALDRONDANIA SIMMONS ARNP                    Ot     
               Z79.899                    OTHER LONG TERM (CURRENT) DRUG 
THERAPY                                     

 

 06/15/2017                    DANIA WALDRON ARNP                    Ot     
               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 
NIP                                     

 

 06/15/2017                    WALDRONDANIA SIMMONS ARNP                    Ot     
               C50.111                    MALIGNANT NEOPLASM OF CENTRAL PORTION 
OF                                     

 

 06/15/2017                    DANIA WALDRON ARNP                    Ot     
               E11.40                    TYPE 2 DIABETES MELLITUS WITH DIABETIC 
N                                     

 

 06/15/2017                    WALDRONDANIA SIMMONS ARNP                    Ot     
               E11.9                    TYPE 2 DIABETES MELLITUS WITHOUT 
COMPLIC                                     

 

 06/15/2017                    WALDRONDANIA SIMMONS ARNP                    Ot     
               E78.0                    PURE HYPERCHOLESTEROLEMIA              
                       

 

 06/15/2017                    WALDRONDANIA SIMMONS ARNP                    Ot     
               I10                    ESSENTIAL (PRIMARY) HYPERTENSION         
                            

 

 06/15/2017                    WALDRONDANIA SIMMONS ARNP                    Ot     
               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
                                     

 

 06/15/2017                    WALDRONDANIA SIMMONS ARNP                    Ot     
               Z79.899                    OTHER LONG TERM (CURRENT) DRUG 
THERAPY                                     

 

 06/15/2017                    WALDRONDANIA SIMMONS ARNP                    Ot     
               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 
NIP                                     

 

 06/15/2017                    WALDRONDANIA SIMMONS ARNP                    Ot     
               C50.111                    MALIGNANT NEOPLASM OF CENTRAL PORTION 
OF                                     

 

 06/15/2017                    DANIA WALDRON ARNP                    Ot     
               C50.911                    MALIGNANT NEOPLASM OF UNSP SITE OF 
RIGHT                                     

 

 06/15/2017                    WALDRONDANIA SIMMONS ARNP                    Ot     
               E11.40                    TYPE 2 DIABETES MELLITUS WITH DIABETIC 
N                                     

 

 06/15/2017                    WALDRONDANIA SIMMONS ARNP                    Ot     
               E11.9                    TYPE 2 DIABETES MELLITUS WITHOUT 
COMPLIC                                     

 

 06/15/2017                    WALDRONDANIA SIMMONS ARNP                    Ot     
               E78.0                    PURE HYPERCHOLESTEROLEMIA              
                       

 

 06/15/2017                    CHIVODANIA ARNP                    Ot     
               G62.9                    POLYNEUROPATHY, UNSPECIFIED            
                         

 

 06/15/2017                    CHIVO DANIA SIMMONS ARNP                    Ot     
               I10                    ESSENTIAL (PRIMARY) HYPERTENSION         
                            

 

 06/15/2017                    DANIA WALDRON ARNP                    Ot     
               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
                                     

 

 06/15/2017                    DANIA WALDRON ARNP                    Ot     
               Z79.899                    OTHER LONG TERM (CURRENT) DRUG 
THERAPY                                     

 

 06/15/2017                    DANIA WALDRON ARNP                    Ot     
               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 
NIP                                     

 

 06/15/2017                    DANIA WALDRON ARNP                    Ot     
               Z92.21                    PERSONAL HISTORY OF ANTINEOPLASTIC 
CHEMO                                     

 

 06/15/2017                    DANIA WALDRON ARNP                    Ot     
               Z92.3                    PERSONAL HISTORY OF IRRADIATION        
                             

 

 06/15/2017                    LEELEE ROSALES N                    Ot           
         C50.111                    MALIGNANT NEOPLASM OF CENTRAL PORTION OF   
                                  

 

 06/15/2017                    LEELEE ROSALES N                    Ot           
         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    
                                 

 

 06/15/2017                    LEELEE ROSALES N                    Ot           
         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     
                                

 

 06/15/2017                    LEELEE ROSALES N                    Ot           
         E78.0                    PURE HYPERCHOLESTEROLEMIA                    
                 

 

 06/15/2017                    LEELEE ROSALES N                    Ot           
         I10                    ESSENTIAL (PRIMARY) HYPERTENSION               
                      

 

 06/15/2017                    LEELEE ROSALES N                    Ot           
         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               
                      

 

 06/15/2017                    LEELEE ROSALES N                    Ot           
         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      
                               

 

 06/15/2017                    LEELEE ROSALES N                    Ot           
         Z79.899                    OTHER LONG TERM (CURRENT) DRUG THERAPY     
                                

 

 06/15/2017                    LEELEE ROSALES N                    Ot           
         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    
                                 

 

 06/15/2017                    LEELEE ROSALES N                    Ot           
         Z92.21                    PERSONAL HISTORY OF ANTINEOPLASTIC CHEMO    
                                 

 

 06/15/2017                    LEELEE ROSALES N                    Ot           
         Z92.3                    PERSONAL HISTORY OF IRRADIATION              
                       

 

 06/15/2017                    LEELEE ROSALES N                    Ot           
         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   
                                  

 

 06/15/2017                    LEELEE ROSALES N                    Ot           
         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     
                                

 

 06/15/2017                    BOBBYLEELEE N                    Ot           
         E78.0                    PURE HYPERCHOLESTEROLEMIA                    
                 

 

 06/15/2017                    LEELEE ROSALES N                    Ot           
         G62.9                    POLYNEUROPATHY, UNSPECIFIED                  
                   

 

 06/15/2017                    BOBBYLEELEE MEDINA N                    Ot           
         I10                    ESSENTIAL (PRIMARY) HYPERTENSION               
                      

 

 06/15/2017                    LEELEE ROSALES N                    Ot           
         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      
                               

 

 06/15/2017                    LEELEE ROSALES N                    Ot           
         Z79.899                    OTHER LONG TERM (CURRENT) DRUG THERAPY     
                                

 

 06/15/2017                    LEELEE ROSALES N                    Ot           
         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    
                                 

 

 06/15/2017                    LEELEE ROSALES N                    Ot           
         Z92.21                    PERSONAL HISTORY OF ANTINEOPLASTIC CHEMO    
                                 

 

 06/15/2017                    LEELEE ROSALES N                    Ot           
         Z92.3                    PERSONAL HISTORY OF IRRADIATION              
                       

 

 06/15/2017                    LEELEE ROSALES N                    Ot           
         C50.111                    MALIGNANT NEOPLASM OF CENTRAL PORTION OF   
                                  

 

 06/15/2017                    LEELEE ROSALES N                    Ot           
         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    
                                 

 

 06/15/2017                    LEELEE ROSALES N                    Ot           
         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     
                                

 

 06/15/2017                    LEELEE ROSALES N                    Ot           
         E78.0                    PURE HYPERCHOLESTEROLEMIA                    
                 

 

 06/15/2017                    LEELEE ROSALES N                    Ot           
         I10                    ESSENTIAL (PRIMARY) HYPERTENSION               
                      

 

 06/15/2017                    LEELEE ROSALES                    Ot           
         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               
                      

 

 06/15/2017                    LEELEE ROSALES                    Ot           
         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      
                               

 

 06/15/2017                    LEELEE ROSALES N                    Ot           
         Z79.899                    OTHER LONG TERM (CURRENT) DRUG THERAPY     
                                

 

 06/15/2017                    LEELEE ROSALES N                    Ot           
         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    
                                 

 

 06/15/2017                    LEELEE ROSALES                    Ot           
         Z92.21                    PERSONAL HISTORY OF ANTINEOPLASTIC CHEMO    
                                 

 

 06/15/2017                    LEELEE ROSALES N                    Ot           
         Z92.3                    PERSONAL HISTORY OF IRRADIATION              
                       

 

 06/15/2017                    PRACHI CARRASCO FACC, ALI FACP CCDS                    
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 
PORTION OF                                     

 

 06/15/2017                    PRACHI CARRASCO FACC, ALI FACP CCDS                    
Ot                    E11.9                    TYPE 2 DIABETES MELLITUS WITHOUT 
COMPLIC                                     

 

 06/15/2017                    PRACHI CARRASCO FACC, ALI FACP CCDS                    
Ot                    E78.0                    PURE HYPERCHOLESTEROLEMIA       
                              

 

 06/15/2017                    PRACHI CARRASCO FACC, ALI FACP CCDS                    
Ot                    I10                    ESSENTIAL (PRIMARY) HYPERTENSION  
                                   

 

 06/15/2017                    LEELEE ROSALES N                    Ot           
         C50.111                    MALIGNANT NEOPLASM OF CENTRAL PORTION OF   
                                  

 

 06/15/2017                    LEELEE ROSALES N                    Ot           
         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    
                                 

 

 06/15/2017                    LEELEE ROSALES N                    Ot           
         E78.0                    PURE HYPERCHOLESTEROLEMIA                    
                 

 

 06/15/2017                    LEELEE ROSALES N                    Ot           
         I10                    ESSENTIAL (PRIMARY) HYPERTENSION               
                      

 

 06/15/2017                    LEELEE ROSALES N                    Ot           
         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               
                      

 

 06/15/2017                    LEELEE ROSALES N                    Ot           
         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      
                               

 

 06/15/2017                    LEELEE ROSALES N                    Ot           
         Z79.899                    OTHER LONG TERM (CURRENT) DRUG THERAPY     
                                

 

 06/15/2017                    LEELEE ROSALES N                    Ot           
         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    
                                 

 

 06/15/2017                    LEELEE ROSALES N                    Ot           
         Z92.21                    PERSONAL HISTORY OF ANTINEOPLASTIC CHEMO    
                                 

 

 06/15/2017                    LEELEE ROSALES N                    Ot           
         Z92.3                    PERSONAL HISTORY OF IRRADIATION              
                       

 

 06/15/2017                    LEELEE ROSALES N                    Ot           
         C50.111                    MALIGNANT NEOPLASM OF CENTRAL PORTION OF   
                                  

 

 06/15/2017                    LEELEE ROSALES N                    Ot           
         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    
                                 

 

 06/15/2017                    LEELEE ROSALES N                    Ot           
         E78.00                    PURE HYPERCHOLESTEROLEMIA, UNSPECIFIED      
                               

 

 06/15/2017                    LEELEE ROSALES N                    Ot           
         G62.9                    POLYNEUROPATHY, UNSPECIFIED                  
                   

 

 06/15/2017                    LEELEE ROSALES N                    Ot           
         I10                    ESSENTIAL (PRIMARY) HYPERTENSION               
                      

 

 06/15/2017                    LEELEE ROSALES N                    Ot           
         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               
                      

 

 06/15/2017                    LEELEE ROSALES N                    Ot           
         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      
                               

 

 06/15/2017                    LEELEE ROSALES N                    Ot           
         Z79.899                    OTHER LONG TERM (CURRENT) DRUG THERAPY     
                                

 

 06/15/2017                    LEELEE ROSALES N                    Ot           
         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    
                                 

 

 06/15/2017                    LEELEE ROSALES                    Ot           
         Z92.21                    PERSONAL HISTORY OF ANTINEOPLASTIC CHEMO    
                                 

 

 06/15/2017                    LEELEE ROSALES N                    Ot           
         Z92.3                    PERSONAL HISTORY OF IRRADIATION              
                       

 

 06/15/2017                    LEELEE ROSALES N                    Ot           
         Z12.31                    ENCNTR SCREEN MAMMOGRAM FOR MALIGNANT NE    
                                 

 

 06/15/2017                    LEELEE ROSALES N                    Ot           
         Z85.3                    PERSONAL HISTORY OF MALIGNANT NEOPLASM O     
                                

 

 06/15/2017                    LEELEE ROSALES N                    Ot           
         C50.111                    MALIGNANT NEOPLASM OF CENTRAL PORTION OF   
                                  

 

 06/15/2017                    LEELEE ROSALES N                    Ot           
         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    
                                 

 

 06/15/2017                    LEELEE ROSALES N                    Ot           
         E78.00                    PURE HYPERCHOLESTEROLEMIA, UNSPECIFIED      
                               

 

 06/15/2017                    BOBBY, BOBAN N                    Ot           
         G62.9                    POLYNEUROPATHY, UNSPECIFIED                  
                   

 

 06/15/2017                    LEELEE ROSALES N                    Ot           
         I10                    ESSENTIAL (PRIMARY) HYPERTENSION               
                      

 

 06/15/2017                    LEELEE ROSALES                    Ot           
         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               
                      

 

 06/15/2017                    LEELEE ROSALES                    Ot           
         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      
                               

 

 06/15/2017                    LEELEE ROSALES                    Ot           
         Z79.899                    OTHER LONG TERM (CURRENT) DRUG THERAPY     
                                

 

 06/15/2017                    LEELEE ROSALES                    Ot           
         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    
                                 

 

 06/15/2017                    LEELEE ROSALES                    Ot           
         Z92.21                    PERSONAL HISTORY OF ANTINEOPLASTIC CHEMO    
                                 

 

 06/15/2017                    LEELEE ROSALES N                    Ot           
         Z92.3                    PERSONAL HISTORY OF IRRADIATION              
                       

 

 06/15/2017                    LEELEE ROSALES                    Ot           
         C50.111                    MALIGNANT NEOPLASM OF CENTRAL PORTION OF   
                                  

 

 06/15/2017                    LEELEE ROSALES                    Ot           
         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    
                                 

 

 06/15/2017                    LEELEE ROSALES                    Ot           
         E78.00                    PURE HYPERCHOLESTEROLEMIA, UNSPECIFIED      
                               

 

 06/15/2017                    LEELEE ROSALES                    Ot           
         G62.9                    POLYNEUROPATHY, UNSPECIFIED                  
                   

 

 06/15/2017                    LEELEE ROSALES                    Ot           
         I10                    ESSENTIAL (PRIMARY) HYPERTENSION               
                      

 

 06/15/2017                    LEELEE ROSALES                    Ot           
         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      
                               

 

 06/15/2017                    LEELEE ROSALES                    Ot           
         Z79.899                    OTHER LONG TERM (CURRENT) DRUG THERAPY     
                                

 

 06/15/2017                    LEELEE ROSALES                    Ot           
         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    
                                 

 

 06/15/2017                    LEELEE ROSALES                    Ot           
         Z92.21                    PERSONAL HISTORY OF ANTINEOPLASTIC CHEMO    
                                 

 

 06/15/2017                    LEELEE ROSALES                    Ot           
         Z92.3                    PERSONAL HISTORY OF IRRADIATION              
                       

 

 2017                                         Ot                    355.8
                    MONONEURITIS LEG NOS                                     

 

 2017                                         Ot                    
724.00                    SPINAL STENOSIS NOS                                  
   

 

 2017                                         Ot                    V10.3
                    HX OF BREAST MALIGNANCY                                     

 

 2017                                         Ot                    
V58.69                    OTH MED,LT,CURRENT USE                               
      

 

 2017                                         Ot                    V67.1
                    RADIOTHERAPY FOLLOW-UP                                     

 

 2017                                         Ot                    V67.2
                    CHEMOTHERAPY FOLLOW-UP                                     

 

 2017                                         Ot                    
724.00                    SPINAL STENOSIS NOS                                  
   

 

 2017                                         Ot                    V10.3
                    HX OF BREAST MALIGNANCY                                     

 

 2017                                         Ot                    
V58.69                    OTH MED,LT,CURRENT USE                               
      

 

 2017                                         Ot                    V67.1
                    RADIOTHERAPY FOLLOW-UP                                     

 

 2017                                         Ot                    V67.2
                    CHEMOTHERAPY FOLLOW-UP                                     

 

 2017                                         Ot                    174.9
                    MALIGN NEOPL BREAST NOS                                     

 

 2017                    LEELEE ROSALES N                    Ot           
         250.00                    DIAB VIC WO COMPL, TYPE II OR UNSPEC TY    
                                 

 

 2017                    BOBBY BOBAN N                    Ot           
         355.8                    MONONEURITIS LEG NOS                         
            

 

 2017                    BOBBY BOBAN N                    Ot           
         724.00                    SPINAL STENOSIS NOS                         
            

 

 2017                    BOBBYLEELEE N                    Ot           
         V10.3                    HX OF BREAST MALIGNANCY                      
               

 

 2017                    BOBBYLEELEE N                    Ot           
         V58.69                    OTH MED,LT,CURRENT USE                      
               

 

 2017                    BOBBY BOBAN N                    Ot           
         V67.1                    RADIOTHERAPY FOLLOW-UP                       
              

 

 2017                    BOBBY BOBKATE N                    Ot           
         V67.2                    CHEMOTHERAPY FOLLOW-UP                       
              

 

 2017                    BOBBY BOBAN N                    Ot           
         174.9                    MALIGN NEOPL BREAST NOS                      
               

 

 2017                    LEELEE ROSALES N                    Ot           
         793.89                    OTH (ABN) FINDINGS ON RADIOLOGICAL EXAMI    
                                 

 

 2017                    BOBBY BOBAN N                    Ot           
         V76.11                    SCRN MAMMO-HIGH RISK PT, MALIGNANT NEOPL    
                                 

 

 2017                    DANIA WALDRON ARNP                    Ot     
               174.9                    MALIGN NEOPL BREAST NOS                
                     

 

 2017                    DANIA WALDRON ARNP                    Ot     
               793.80                    UNSPEC ABNORMAL MAMMOGRAM             
                        

 

 2017                    BOBBY AMYKATE N                    Ot           
         250.00                    DIAB VIC WO COMPL, TYPE II OR UNSPEC TY    
                                 

 

 2017                    BOBBY BOBAN N                    Ot           
         355.8                    MONONEURITIS LEG NOS                         
            

 

 2017                    BOBBY BOBAN N                    Ot           
         724.00                    SPINAL STENOSIS NOS                         
            

 

 2017                    BOBBY BOBAN N                    Ot           
         V10.3                    HX OF BREAST MALIGNANCY                      
               

 

 2017                    LEELEE ROSALES N                    Ot           
         V58.69                    OTH MED,LT,CURRENT USE                      
               

 

 2017                    BOBBY BOBAN N                    Ot           
         V67.1                    RADIOTHERAPY FOLLOW-UP                       
              

 

 2017                    BOBBY BOBAN N                    Ot           
         V67.2                    CHEMOTHERAPY FOLLOW-UP                       
              

 

 2017                    BOBBY BOBAN N                    Ot           
         174.9                    MALIGN NEOPL BREAST NOS                      
               

 

 2017                    BOBBY, BOBAN N                    Ot           
         250.00                    DIAB VIC WO COMPL, TYPE II OR UNSPEC TY    
                                 

 

 2017                    BOBBY, BOBAN N                    Ot           
         355.8                    MONONEURITIS LEG NOS                         
            

 

 2017                    BOBBY, BOBAN N                    Ot           
         724.00                    SPINAL STENOSIS NOS                         
            

 

 2017                    BOBBY, BOBAN N                    Ot           
         V10.3                    HX OF BREAST MALIGNANCY                      
               

 

 2017                    BOBBY, BOBAN N                    Ot           
         V58.69                    OTH MED,LT,CURRENT USE                      
               

 

 2017                    BOBBY, BOBAN N                    Ot           
         V67.1                    RADIOTHERAPY FOLLOW-UP                       
              

 

 2017                    BOBBY, BOBAN N                    Ot           
         V67.2                    CHEMOTHERAPY FOLLOW-UP                       
              

 

 2017                    BOBBY, BOBAN N                    Ot           
         174.9                    MALIGN NEOPL BREAST NOS                      
               

 

 2017                    BOBBY, BOBAN N                    Ot           
         174.9                    MALIGN NEOPL BREAST NOS                      
               

 

 2017                    BOBBY, BOBAN N                    Ot           
         250.00                    DIAB VIC WO COMPL, TYPE II OR UNSPEC TY    
                                 

 

 2017                    BOBBY, BOBAN N                    Ot           
         355.8                    MONONEURITIS LEG NOS                         
            

 

 2017                    BOBBY, BOBAN N                    Ot           
         724.00                    SPINAL STENOSIS NOS                         
            

 

 2017                    BOBBY, BOBAN N                    Ot           
         V10.3                    HX OF BREAST MALIGNANCY                      
               

 

 2017                    BOBBY, BOBAN N                    Ot           
         V58.69                    OTH MED,LT,CURRENT USE                      
               

 

 2017                    BOBBY, BOBAN N                    Ot           
         V67.1                    RADIOTHERAPY FOLLOW-UP                       
              

 

 2017                    BOBBY, BOBAN N                    Ot           
         V67.2                    CHEMOTHERAPY FOLLOW-UP                       
              

 

 2017                    BOBBY, BOBAN N                    Ot           
         174.9                    MALIGN NEOPL BREAST NOS                      
               

 

 2017                    BOBBY, BOBAN N                    Ot           
         174.9                    MALIGN NEOPL BREAST NOS                      
               

 

 2017                    PRACHI CARRASCO FACC, ALI FACP CCDS                    
Ot                    174.9                    MALIGN NEOPL BREAST NOS         
                            

 

 2017                    PRACHI CARRASCO FACBASIL, ALI FACP CCDS                    
Ot                    250.00                    DIAB VIC WO COMPL, TYPE II OR 
UNSPEC TY                                     

 

 2017                    PRACHI CARRASCO FACC, ALI FACP CCDS                    
Ot                    272.4                    HYPERLIPIDEMIA NEC/NOS          
                           

 

 2017                    PRACHI CARRASCO FACC, ALI FACP CCDS                    
Ot                    278.00                    OBESITY, NOS                   
                  

 

 2017                    PRACHI CARRASCO FACBASIL, ALI FACP CCDS                    
Ot                    401.9                    HYPERTENSION NOS                
                     

 

 2017                    PRACHI CARRASCO FAC, ALI FACP CCDS                    
Ot                    V85.30                    BODY MASS INDEX 30.0-30.9, 
ADULT                                     

 

 2017                    CHRISTOS LAWLER MD                    Ot      
              V72.84                    EXAM PRE-OPERATIVE NOS                 
                    

 

 2017                    CHRISTOS LAWLER MD                    Ot      
              174.9                    MALIGN NEOPL BREAST NOS                 
                    

 

 2017                    CHRISTOS LAWLER MD                    Ot      
              V72.63                    PRE-PROCEDURAL LABORATORY EXAMINATION  
                                   

 

 2017                    CHRISTOS LAWLER MD                    Ot      
              V74.8                    SCREEN-BACTERIAL DIS NEC                
                     

 

 2017                    CHRISTOS LAWLER MD                    Ot      
              786.6                    CHEST SWELLING/MASS/LUMP                
                     

 

 2017                    CHRISTOS LAWLER MD                    Ot      
              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  
                                   

 

 2017                    BOBBYLEELEE MEDINA                    Ot           
         174.9                    MALIGN NEOPL BREAST NOS                      
               

 

 2017                    LEELEE ROSALES                    Ot           
         V15.3                    HX OF IRRADIATION                            
         

 

 2017                    LEELEE ROSALES                    Ot           
         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       
                              

 

 2017                    BOBBYLEELEE N                    Ot           
         V58.69                    OTH MED,LT,CURRENT USE                      
               

 

 2017                    BOBBYLEELEE MEDINA N                    Ot           
         V87.41                    PERSONAL HISTORY OF ANTINEOPLASTIC CHEMO    
                                 

 

 2017                    CHRISTOS LAWLER MD                    Ot      
              V10.3                    HX OF BREAST MALIGNANCY                 
                    

 

 2017                    CHRISTOS LAWLER MD                    Ot      
              V72.84                    EXAM PRE-OPERATIVE NOS                 
                    

 

 2017                    DANIA WALDRON ARNP                    Ot     
               C50.911                    MALIGNANT NEOPLASM OF UNSP SITE OF 
RIGHT                                     

 

 2017                    DANIA WALDRON ARNP                    Ot     
               E11.9                    TYPE 2 DIABETES MELLITUS WITHOUT 
COMPLIC                                     

 

 2017                    DANIA WALDRON ARNP                    Ot     
               E78.0                    PURE HYPERCHOLESTEROLEMIA              
                       

 

 2017                    DANIA WALDRON ARNP                    Ot     
               G62.9                    POLYNEUROPATHY, UNSPECIFIED            
                         

 

 2017                    DANIA WALDRON ARNP                    Ot     
               I10                    ESSENTIAL (PRIMARY) HYPERTENSION         
                            

 

 2017                    DANIA WALDRON ARNP                    Ot     
               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
                                     

 

 2017                    DANIA WALDRON ARNP                    Ot     
               Z79.899                    OTHER LONG TERM (CURRENT) DRUG 
THERAPY                                     

 

 2017                    DANIA WALDRON ARNP                    Ot     
               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 
NIP                                     

 

 2017                    DANIA WALDRON ARNP                    Ot     
               Z92.21                    PERSONAL HISTORY OF ANTINEOPLASTIC 
CHEMO                                     

 

 2017                    DANIA WALDRON ARNP                    Ot     
               Z92.3                    PERSONAL HISTORY OF IRRADIATION        
                             

 

 2017                    DANIA WALDRON ARNP                    Ot     
               C50.111                    MALIGNANT NEOPLASM OF CENTRAL PORTION 
OF                                     

 

 2017                    WALDRONDANIA SIMMONS ARNP                    Ot     
               E11.40                    TYPE 2 DIABETES MELLITUS WITH DIABETIC 
N                                     

 

 2017                    WALDRONDANIA SIMMONS ARNP                    Ot     
               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
                                     

 

 2017                    WALDRONDANIA SIMMONS ARNP                    Ot     
               Z79.899                    OTHER LONG TERM (CURRENT) DRUG 
THERAPY                                     

 

 2017                    WALDRONDANIA SIMMONSP                    Ot     
               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 
NIP                                     

 

 2017                    WALDRON DANIA SIMMONS ARNP                    Ot     
               C50.111                    MALIGNANT NEOPLASM OF CENTRAL PORTION 
OF                                     

 

 2017                    CHIVO DANIA SIMMONS ARNP                    Ot     
               E11.40                    TYPE 2 DIABETES MELLITUS WITH DIABETIC 
N                                     

 

 2017                    WALDRONDANIA SIMMONS ARNP                    Ot     
               E11.9                    TYPE 2 DIABETES MELLITUS WITHOUT 
COMPLIC                                     

 

 2017                    WALDRONDANIA SIMMONS ARNP                    Ot     
               E78.0                    PURE HYPERCHOLESTEROLEMIA              
                       

 

 2017                    WALDRONDANIA SIMMONS ARNP                    Ot     
               I10                    ESSENTIAL (PRIMARY) HYPERTENSION         
                            

 

 2017                    WALDRONDANIA SIMMONS ARNP                    Ot     
               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
                                     

 

 2017                    WALDRONDANIA SIMMONS ARNP                    Ot     
               Z79.899                    OTHER LONG TERM (CURRENT) DRUG 
THERAPY                                     

 

 2017                    WALDRON DANIA MARTINEZP                    Ot     
               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 
NIP                                     

 

 2017                    WALDRON DANIA SIMMONS ARNP                    Ot     
               C50.111                    MALIGNANT NEOPLASM OF CENTRAL PORTION 
OF                                     

 

 2017                    CHIVO DANIA SIMMONS ARNP                    Ot     
               C50.911                    MALIGNANT NEOPLASM OF UNSP SITE OF 
RIGHT                                     

 

 2017                    CHIVO DANIA SIMMONS ARNP                    Ot     
               E11.40                    TYPE 2 DIABETES MELLITUS WITH DIABETIC 
N                                     

 

 2017                    CHIVO DANIA SIMMONS ARNP                    Ot     
               E11.9                    TYPE 2 DIABETES MELLITUS WITHOUT 
COMPLIC                                     

 

 2017                    CHIVO DANIA SIMMONS ARNP                    Ot     
               E78.0                    PURE HYPERCHOLESTEROLEMIA              
                       

 

 2017                    DANIA WALDRONP                    Ot     
               G62.9                    POLYNEUROPATHY, UNSPECIFIED            
                         

 

 2017                    DANIA WALDRON ARNP                    Ot     
               I10                    ESSENTIAL (PRIMARY) HYPERTENSION         
                            

 

 2017                    DANIA WALDRON ARNP                    Ot     
               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
                                     

 

 2017                    DANIA WALDRON ARNP                    Ot     
               Z79.899                    OTHER LONG TERM (CURRENT) DRUG 
THERAPY                                     

 

 2017                    DANIA WALDRONP                    Ot     
               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 
NIP                                     

 

 2017                    DANIA WALDRONP                    Ot     
               Z92.21                    PERSONAL HISTORY OF ANTINEOPLASTIC 
CHEMO                                     

 

 2017                    DANIA WALDRONP                    Ot     
               Z92.3                    PERSONAL HISTORY OF IRRADIATION        
                             

 

 2017                    LEELEE ROSALES MALENA                    Ot           
         C50.111                    MALIGNANT NEOPLASM OF CENTRAL PORTION OF   
                                  

 

 2017                    BOBBYLEELEE MEDINA N                    Ot           
         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    
                                 

 

 2017                    BOBBY, LEELEE N                    Ot           
         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     
                                

 

 2017                    LEELEE ROSALES N                    Ot           
         E78.0                    PURE HYPERCHOLESTEROLEMIA                    
                 

 

 2017                    LEELEE ROSALES N                    Ot           
         I10                    ESSENTIAL (PRIMARY) HYPERTENSION               
                      

 

 2017                    LEELEE ROSALES N                    Ot           
         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               
                      

 

 2017                    LEELEE ROSALES N                    Ot           
         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      
                               

 

 2017                    LEELEE ROSALES N                    Ot           
         Z79.899                    OTHER LONG TERM (CURRENT) DRUG THERAPY     
                                

 

 2017                    LEELEE ROSALES N                    Ot           
         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    
                                 

 

 2017                    LEELEE ROSALES N                    Ot           
         Z92.21                    PERSONAL HISTORY OF ANTINEOPLASTIC CHEMO    
                                 

 

 2017                    LEELEE ROSALES N                    Ot           
         Z92.3                    PERSONAL HISTORY OF IRRADIATION              
                       

 

 2017                    BOBBY LEELEE N                    Ot           
         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   
                                  

 

 2017                    LEELEE ROSALES N                    Ot           
         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     
                                

 

 2017                    BOBBYLEELEE N                    Ot           
         E78.0                    PURE HYPERCHOLESTEROLEMIA                    
                 

 

 2017                    BOBBY, AMYKATE N                    Ot           
         G62.9                    POLYNEUROPATHY, UNSPECIFIED                  
                   

 

 2017                    BOBBYLEELEE MEDINA N                    Ot           
         I10                    ESSENTIAL (PRIMARY) HYPERTENSION               
                      

 

 2017                    LEELEE ROSALES N                    Ot           
         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      
                               

 

 2017                    LEELEE ROSALES N                    Ot           
         Z79.899                    OTHER LONG TERM (CURRENT) DRUG THERAPY     
                                

 

 2017                    LEELEE ROSALES N                    Ot           
         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    
                                 

 

 2017                    LEELEE ROSALES                    Ot           
         Z92.21                    PERSONAL HISTORY OF ANTINEOPLASTIC CHEMO    
                                 

 

 2017                    LEELEE ROSALES N                    Ot           
         Z92.3                    PERSONAL HISTORY OF IRRADIATION              
                       

 

 2017                    LEELEE ROSALES N                    Ot           
         C50.111                    MALIGNANT NEOPLASM OF CENTRAL PORTION OF   
                                  

 

 2017                    LEELEE ROSALES N                    Ot           
         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    
                                 

 

 2017                    LEELEE ROSALES N                    Ot           
         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     
                                

 

 2017                    LEELEE ROSALES N                    Ot           
         E78.0                    PURE HYPERCHOLESTEROLEMIA                    
                 

 

 2017                    LEELEE ROSALES N                    Ot           
         I10                    ESSENTIAL (PRIMARY) HYPERTENSION               
                      

 

 2017                    LEELEE ROSALES N                    Ot           
         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               
                      

 

 2017                    LEELEE ROSALES N                    Ot           
         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      
                               

 

 2017                    LEELEE ROSALES N                    Ot           
         Z79.899                    OTHER LONG TERM (CURRENT) DRUG THERAPY     
                                

 

 2017                    LEELEE ROSALES N                    Ot           
         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    
                                 

 

 2017                    LEELEE ROSALES N                    Ot           
         Z92.21                    PERSONAL HISTORY OF ANTINEOPLASTIC CHEMO    
                                 

 

 2017                    LEELEE ROSALES N                    Ot           
         Z92.3                    PERSONAL HISTORY OF IRRADIATION              
                       

 

 2017                    PRACHI CARRASCO FACC, ALI FACP CCDS                    
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 
PORTION OF                                     

 

 2017                    PRACHI CARRASCO FACC, ALI FACP CCDS                    
Ot                    E11.9                    TYPE 2 DIABETES MELLITUS WITHOUT 
COMPLIC                                     

 

 2017                    PRACHI CARRASCO FACC, ALI FACP CCDS                    
Ot                    E78.0                    PURE HYPERCHOLESTEROLEMIA       
                              

 

 2017                    PRACHI CARRASCO FACC, ALI FACP CCDS                    
Ot                    I10                    ESSENTIAL (PRIMARY) HYPERTENSION  
                                   

 

 2017                    LEELEE ROSALES N                    Ot           
         C50.111                    MALIGNANT NEOPLASM OF CENTRAL PORTION OF   
                                  

 

 2017                    LEELEE ROSALES N                    Ot           
         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    
                                 

 

 2017                    LEELEE ROSALES N                    Ot           
         E78.0                    PURE HYPERCHOLESTEROLEMIA                    
                 

 

 2017                    LEELEE ROSALES N                    Ot           
         I10                    ESSENTIAL (PRIMARY) HYPERTENSION               
                      

 

 2017                    LEELEE ROSALES N                    Ot           
         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               
                      

 

 2017                    LEELEE ROSALES N                    Ot           
         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      
                               

 

 2017                    LEELEE ROSALES N                    Ot           
         Z79.899                    OTHER LONG TERM (CURRENT) DRUG THERAPY     
                                

 

 2017                    LEELEE ROSALES N                    Ot           
         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    
                                 

 

 2017                    LEELEE ROSALES N                    Ot           
         Z92.21                    PERSONAL HISTORY OF ANTINEOPLASTIC CHEMO    
                                 

 

 2017                    LEELEE ROSALES N                    Ot           
         Z92.3                    PERSONAL HISTORY OF IRRADIATION              
                       

 

 2017                    LEELEE ROSALES N                    Ot           
         C50.111                    MALIGNANT NEOPLASM OF CENTRAL PORTION OF   
                                  

 

 2017                    LEELEE ROSALES N                    Ot           
         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    
                                 

 

 2017                    LEELEE ROSALES N                    Ot           
         E78.00                    PURE HYPERCHOLESTEROLEMIA, UNSPECIFIED      
                               

 

 2017                    LEELEE ROSALES N                    Ot           
         G62.9                    POLYNEUROPATHY, UNSPECIFIED                  
                   

 

 2017                    LEELEE ROSALES N                    Ot           
         I10                    ESSENTIAL (PRIMARY) HYPERTENSION               
                      

 

 2017                    LEELEE ROSALES N                    Ot           
         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               
                      

 

 2017                    LEELEE ROSALES N                    Ot           
         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      
                               

 

 2017                    LEELEE ROSALES N                    Ot           
         Z79.899                    OTHER LONG TERM (CURRENT) DRUG THERAPY     
                                

 

 2017                    LEELEE ROSALES N                    Ot           
         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    
                                 

 

 2017                    LEELEE ROSALES N                    Ot           
         Z92.21                    PERSONAL HISTORY OF ANTINEOPLASTIC CHEMO    
                                 

 

 2017                    LEELEE ROSALES N                    Ot           
         Z92.3                    PERSONAL HISTORY OF IRRADIATION              
                       

 

 2017                    LEELEE ROSALES N                    Ot           
         Z12.31                    ENCNTR SCREEN MAMMOGRAM FOR MALIGNANT NE    
                                 

 

 2017                    LEELEE ROSALES N                    Ot           
         Z85.3                    PERSONAL HISTORY OF MALIGNANT NEOPLASM O     
                                

 

 2017                    LEELEE ROSALES N                    Ot           
         C50.111                    MALIGNANT NEOPLASM OF CENTRAL PORTION OF   
                                  

 

 2017                    LEELEE ROSALES N                    Ot           
         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    
                                 

 

 2017                    LEELEE ROSALES N                    Ot           
         E78.00                    PURE HYPERCHOLESTEROLEMIA, UNSPECIFIED      
                               

 

 2017                    LEELEE ROSALES                    Ot           
         G62.9                    POLYNEUROPATHY, UNSPECIFIED                  
                   

 

 2017                    LEELEE ROSALES N                    Ot           
         I10                    ESSENTIAL (PRIMARY) HYPERTENSION               
                      

 

 2017                    LEELEE ROSALES                    Ot           
         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               
                      

 

 2017                    LEELEE ROSALES                    Ot           
         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      
                               

 

 2017                    LEELEE ROSALES N                    Ot           
         Z79.899                    OTHER LONG TERM (CURRENT) DRUG THERAPY     
                                

 

 2017                    LEELEE ROSALES N                    Ot           
         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    
                                 

 

 2017                    LEELEE ROSALES N                    Ot           
         Z92.21                    PERSONAL HISTORY OF ANTINEOPLASTIC CHEMO    
                                 

 

 2017                    LEELEE ROSALES                    Ot           
         Z92.3                    PERSONAL HISTORY OF IRRADIATION              
                       

 

 2017                    LEELEE ROSALES                    Ot           
         C50.111                    MALIGNANT NEOPLASM OF CENTRAL PORTION OF   
                                  

 

 2017                    LEELEE ROSALES N                    Ot           
         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    
                                 

 

 2017                    LEELEE ROSALES N                    Ot           
         E78.00                    PURE HYPERCHOLESTEROLEMIA, UNSPECIFIED      
                               

 

 2017                    LEELEE ROSALES N                    Ot           
         G62.9                    POLYNEUROPATHY, UNSPECIFIED                  
                   

 

 2017                    LEELEE ROSALES N                    Ot           
         I10                    ESSENTIAL (PRIMARY) HYPERTENSION               
                      

 

 2017                    LEELEE ROSALES                    Ot           
         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      
                               

 

 2017                    LEELEE ROSALES                    Ot           
         Z79.899                    OTHER LONG TERM (CURRENT) DRUG THERAPY     
                                

 

 2017                    LEELEE ROSALES N                    Ot           
         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    
                                 

 

 2017                    LEELEE ROSALES N                    Ot           
         Z92.21                    PERSONAL HISTORY OF ANTINEOPLASTIC CHEMO    
                                 

 

 2017                    LEELEE ROSALES N                    Ot           
         Z92.3                    PERSONAL HISTORY OF IRRADIATION              
                       

 

 2017                                         Ot                    355.8
                    MONONEURITIS LEG NOS                                     

 

 2017                                         Ot                    
724.00                    SPINAL STENOSIS NOS                                  
   

 

 2017                                         Ot                    V10.3
                    HX OF BREAST MALIGNANCY                                     

 

 2017                                         Ot                    
V58.69                    OTH MED,LT,CURRENT USE                               
      

 

 2017                                         Ot                    V67.1
                    RADIOTHERAPY FOLLOW-UP                                     

 

 2017                                         Ot                    V67.2
                    CHEMOTHERAPY FOLLOW-UP                                     

 

 2017                                         Ot                    
724.00                    SPINAL STENOSIS NOS                                  
   

 

 2017                                         Ot                    V10.3
                    HX OF BREAST MALIGNANCY                                     

 

 2017                                         Ot                    
V58.69                    OTH MED,LT,CURRENT USE                               
      

 

 2017                                         Ot                    V67.1
                    RADIOTHERAPY FOLLOW-UP                                     

 

 2017                                         Ot                    V67.2
                    CHEMOTHERAPY FOLLOW-UP                                     

 

 2017                                         Ot                    174.9
                    MALIGN NEOPL BREAST NOS                                     

 

 2017                    LEELEE ROSALES N                    Ot           
         250.00                    DIAB VIC WO COMPL, TYPE II OR UNSPEC TY    
                                 

 

 2017                    LEELEE ROSALES N                    Ot           
         355.8                    MONONEURITIS LEG NOS                         
            

 

 2017                    LEELEE ROSALES N                    Ot           
         724.00                    SPINAL STENOSIS NOS                         
            

 

 2017                    LEELEE ROSALES N                    Ot           
         V10.3                    HX OF BREAST MALIGNANCY                      
               

 

 2017                    LEELEE ROSALES N                    Ot           
         V58.69                    OTH MED,LT,CURRENT USE                      
               

 

 2017                    LEELEE ROSALES N                    Ot           
         V67.1                    RADIOTHERAPY FOLLOW-UP                       
              

 

 2017                    LEELEE ROSALES N                    Ot           
         V67.2                    CHEMOTHERAPY FOLLOW-UP                       
              

 

 2017                    LEELEE ROSALES N                    Ot           
         174.9                    MALIGN NEOPL BREAST NOS                      
               

 

 2017                    LEELEE ROSALES N                    Ot           
         793.89                    OTH (ABN) FINDINGS ON RADIOLOGICAL EXAMI    
                                 

 

 2017                    LEELEE ROSALES N                    Ot           
         V76.11                    SCRN MAMMO-HIGH RISK PT, MALIGNANT NEOPL    
                                 

 

 2017                    DANIA WALDRON ARNP                    Ot     
               174.9                    MALIGN NEOPL BREAST NOS                
                     

 

 2017                    DANIA WALDRON ARNP                    Ot     
               793.80                    UNSPEC ABNORMAL MAMMOGRAM             
                        

 

 2017                    LEELEE ROSALES N                    Ot           
         250.00                    DIAB VIC WO COMPL, TYPE II OR UNSPEC TY    
                                 

 

 2017                    LEELEE ROSALES N                    Ot           
         355.8                    MONONEURITIS LEG NOS                         
            

 

 2017                    BOBBY BOBKATE N                    Ot           
         724.00                    SPINAL STENOSIS NOS                         
            

 

 2017                    LEELEE ROSALES N                    Ot           
         V10.3                    HX OF BREAST MALIGNANCY                      
               

 

 2017                    LEELEE ROSALES N                    Ot           
         V58.69                    OTH MED,LT,CURRENT USE                      
               

 

 2017                    LEELEE ROSALES N                    Ot           
         V67.1                    RADIOTHERAPY FOLLOW-UP                       
              

 

 2017                    BOBBY, BOBAN N                    Ot           
         V67.2                    CHEMOTHERAPY FOLLOW-UP                       
              

 

 2017                    BOBBY, BOBAN N                    Ot           
         174.9                    MALIGN NEOPL BREAST NOS                      
               

 

 2017                    BOBBY, BOBAN N                    Ot           
         250.00                    DIAB VIC WO COMPL, TYPE II OR UNSPEC TY    
                                 

 

 2017                    BOBBY, BOBAN N                    Ot           
         355.8                    MONONEURITIS LEG NOS                         
            

 

 2017                    BOBBY, BOBAN N                    Ot           
         724.00                    SPINAL STENOSIS NOS                         
            

 

 2017                    BOBBY BOBAN N                    Ot           
         V10.3                    HX OF BREAST MALIGNANCY                      
               

 

 2017                    BOBBY, BOBAN N                    Ot           
         V58.69                    OTH MED,LT,CURRENT USE                      
               

 

 2017                    BOBBY, BOBAN N                    Ot           
         V67.1                    RADIOTHERAPY FOLLOW-UP                       
              

 

 2017                    BBOBY, BOBAN N                    Ot           
         V67.2                    CHEMOTHERAPY FOLLOW-UP                       
              

 

 2017                    BOBBY, BOBAN N                    Ot           
         174.9                    MALIGN NEOPL BREAST NOS                      
               

 

 2017                    BOBBY, BOBAN N                    Ot           
         174.9                    MALIGN NEOPL BREAST NOS                      
               

 

 2017                    BOBBY, BOBAN N                    Ot           
         250.00                    DIAB VIC WO COMPL, TYPE II OR UNSPEC TY    
                                 

 

 2017                    BOBBY, BOBAN N                    Ot           
         355.8                    MONONEURITIS LEG NOS                         
            

 

 2017                    BOBBY, BOBAN N                    Ot           
         724.00                    SPINAL STENOSIS NOS                         
            

 

 2017                    BOBBY, BOBAN N                    Ot           
         V10.3                    HX OF BREAST MALIGNANCY                      
               

 

 2017                    BOBBY, BOBAN N                    Ot           
         V58.69                    OTH MED,LT,CURRENT USE                      
               

 

 2017                    BOBBY, BOBAN N                    Ot           
         V67.1                    RADIOTHERAPY FOLLOW-UP                       
              

 

 2017                    BOBBY, BOBAN N                    Ot           
         V67.2                    CHEMOTHERAPY FOLLOW-UP                       
              

 

 2017                    BOBBY, BOBAN N                    Ot           
         174.9                    MALIGN NEOPL BREAST NOS                      
               

 

 2017                    BOBBY, BOBAN N                    Ot           
         174.9                    MALIGN NEOPL BREAST NOS                      
               

 

 2017                    PRACHI CARRASCO FACC, ALI [facility] CCDS                    
Ot                    174.9                    MALIGN NEOPL BREAST NOS         
                            

 

 2017                    PRACHI CARRASCO FACC, ALI FACP CCDS                    
Ot                    250.00                    DIAB VIC WO COMPL, TYPE II OR 
UNSPEC TY                                     

 

 2017                    PRACHI CARRASCO FACBASIL, ALI FACP CCDS                    
Ot                    272.4                    HYPERLIPIDEMIA NEC/NOS          
                           

 

 2017                    [facility]                    
[facility]                    278.00                    OBESITY, NOS                   
                  

 

 2017                    PR[facility]CO [facility], [facility]                    
Ot                    401.9                    HYPERTENSION NOS                
                     

 

 2017                    [facility]                    
Ot                    V85.30                    BODY MASS INDEX 30.0-30.9, 
ADULT                                     

 

 2017                    NURIS CARRASCO, CHRISTOS NORTON                    Ot      
              V72.84                    EXAM PRE-OPERATIVE NOS                 
                    

 

 2017                    CHRISTOS LAWLER MD                    Ot      
              174.9                    MALIGN NEOPL BREAST NOS                 
                    

 

 2017                    CHRISTOS LAWLER MD                    Ot      
              V72.63                    PRE-PROCEDURAL LABORATORY EXAMINATION  
                                   

 

 2017                    CHRISTOS LAWLER MD                    Ot      
              V74.8                    SCREEN-BACTERIAL DIS NEC                
                     

 

 2017                    NURIS CARRASCO, CHRISTOS NORTON                    Ot      
              786.6                    CHEST SWELLING/MASS/LUMP                
                     

 

 2017                    CHRISTOS LAWLER MD                    Ot      
              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  
                                   

 

 2017                    LEELEE ROSALES                    Ot           
         174.9                    MALIGN NEOPL BREAST NOS                      
               

 

 2017                    LEELEE ROSALES                    Ot           
         V15.3                    HX OF IRRADIATION                            
         

 

 2017                    LEELEE ROSALES                    Ot           
         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       
                              

 

 2017                    LEELEE ROSALES                    Ot           
         V58.69                    OTH MED,LT,CURRENT USE                      
               

 

 2017                    LEELEE ROSALES                    Ot           
         V87.41                    PERSONAL HISTORY OF ANTINEOPLASTIC CHEMO    
                                 

 

 2017                    CHRISTOS LAWLER MD                    Ot      
              V10.3                    HX OF BREAST MALIGNANCY                 
                    

 

 2017                    CHRISTOS LAWLER MD                    Ot      
              V72.84                    EXAM PRE-OPERATIVE NOS                 
                    

 

 2017                    DANIA WALDRON ARNP                    Ot     
               C50.911                    MALIGNANT NEOPLASM OF UNSP SITE OF 
RIGHT                                     

 

 2017                    DANIA WALDRON ARNP                    Ot     
               E11.9                    TYPE 2 DIABETES MELLITUS WITHOUT 
COMPLIC                                     

 

 2017                    DANIA WALDRON ARNP                    Ot     
               E78.0                    PURE HYPERCHOLESTEROLEMIA              
                       

 

 2017                    DANIA WALDRON ARNP                    Ot     
               G62.9                    POLYNEUROPATHY, UNSPECIFIED            
                         

 

 2017                    DANIA WALDRON ARNP                    Ot     
               I10                    ESSENTIAL (PRIMARY) HYPERTENSION         
                            

 

 2017                    DANIA WALDRON ARNP                    Ot     
               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
                                     

 

 2017                    DANIA WALDRON ARNP                    Ot     
               Z79.899                    OTHER LONG TERM (CURRENT) DRUG 
THERAPY                                     

 

 2017                    WALDRONDANIA SIMMONS ARNP                    Ot     
               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 
NIP                                     

 

 2017                    DANIA WALDRON ARNP                    Ot     
               Z92.21                    PERSONAL HISTORY OF ANTINEOPLASTIC 
CHEMO                                     

 

 2017                    DANIA WALDRON ARNP                    Ot     
               Z92.3                    PERSONAL HISTORY OF IRRADIATION        
                             

 

 2017                    WALDRONDANIA SIMMONS ARNP                    Ot     
               C50.111                    MALIGNANT NEOPLASM OF CENTRAL PORTION 
OF                                     

 

 2017                    WALDRONDANIA SIMMONS ARNP                    Ot     
               E11.40                    TYPE 2 DIABETES MELLITUS WITH DIABETIC 
N                                     

 

 2017                    WALDRONDANIA SIMMONS ARNP                    Ot     
               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
                                     

 

 2017                    WALDRONDANIA SIMMONS ARNP                    Ot     
               Z79.899                    OTHER LONG TERM (CURRENT) DRUG 
THERAPY                                     

 

 2017                    WALDRONDANIA SIMMONS ARNP                    Ot     
               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 
NIP                                     

 

 2017                    WALDRON DANIA SIMMONS ARNP                    Ot     
               C50.111                    MALIGNANT NEOPLASM OF CENTRAL PORTION 
OF                                     

 

 2017                    WALDRON DANIA SIMMONS ARNP                    Ot     
               E11.40                    TYPE 2 DIABETES MELLITUS WITH DIABETIC 
N                                     

 

 2017                    CHIVO DANIA SIMMONS ARNP                    Ot     
               E11.9                    TYPE 2 DIABETES MELLITUS WITHOUT 
COMPLIC                                     

 

 2017                    WALDRONDANIA SIMMONS ARNP                    Ot     
               E78.0                    PURE HYPERCHOLESTEROLEMIA              
                       

 

 2017                    CHIVO DANIA SIMMONS ARNP                    Ot     
               I10                    ESSENTIAL (PRIMARY) HYPERTENSION         
                            

 

 2017                    WALDRON DANIA SIMMONS ARNP                    Ot     
               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
                                     

 

 2017                    WALDRONDANIA SIMMONS ARNP                    Ot     
               Z79.899                    OTHER LONG TERM (CURRENT) DRUG 
THERAPY                                     

 

 2017                    WALDRONDANIA SIMMONS ARNP                    Ot     
               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 
NIP                                     

 

 2017                    CHIVODANIA ARNP                    Ot     
               C50.111                    MALIGNANT NEOPLASM OF CENTRAL PORTION 
OF                                     

 

 2017                    CHIVODANIA ARNP                    Ot     
               C50.911                    MALIGNANT NEOPLASM OF UNSP SITE OF 
RIGHT                                     

 

 2017                    DANIA WALDRON ARNP                    Ot     
               E11.40                    TYPE 2 DIABETES MELLITUS WITH DIABETIC 
N                                     

 

 2017                    DANIA WALDRON S ARNP                    Ot     
               E11.9                    TYPE 2 DIABETES MELLITUS WITHOUT 
COMPLIC                                     

 

 2017                    DANIA WALDRONP                    Ot     
               E78.0                    PURE HYPERCHOLESTEROLEMIA              
                       

 

 2017                    DANIA WALDRON ARNP                    Ot     
               G62.9                    POLYNEUROPATHY, UNSPECIFIED            
                         

 

 2017                    DANIA WALDRON ARNP                    Ot     
               I10                    ESSENTIAL (PRIMARY) HYPERTENSION         
                            

 

 2017                    DANIA WALDRON ARNP                    Ot     
               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
                                     

 

 2017                    DANIA WALDRON ARNP                    Ot     
               Z79.899                    OTHER LONG TERM (CURRENT) DRUG 
THERAPY                                     

 

 2017                    DANIA WALDRONP                    Ot     
               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 
NIP                                     

 

 2017                    DANIA WALDRONP                    Ot     
               Z92.21                    PERSONAL HISTORY OF ANTINEOPLASTIC 
CHEMO                                     

 

 2017                    DANIA WALDRONP                    Ot     
               Z92.3                    PERSONAL HISTORY OF IRRADIATION        
                             

 

 2017                    BOBBY, LEELEE GUY                    Ot           
         C50.111                    MALIGNANT NEOPLASM OF CENTRAL PORTION OF   
                                  

 

 2017                    BOBBY, LEELEE GUY                    Ot           
         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    
                                 

 

 2017                    BOBBY, LEELEE N                    Ot           
         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     
                                

 

 2017                    BOBBY, AMYKATE MALENA                    Ot           
         E78.0                    PURE HYPERCHOLESTEROLEMIA                    
                 

 

 2017                    BOBBY, LEELEE GUY                    Ot           
         I10                    ESSENTIAL (PRIMARY) HYPERTENSION               
                      

 

 2017                    BOBBY AMYKATE N                    Ot           
         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               
                      

 

 2017                    BOBBY, AMYKATE MALENA                    Ot           
         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      
                               

 

 2017                    BOBBY, LEELEE GUY                    Ot           
         Z79.899                    OTHER LONG TERM (CURRENT) DRUG THERAPY     
                                

 

 2017                    BOBBY LEELEE N                    Ot           
         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    
                                 

 

 2017                    BOBBYLEELEE                    Ot           
         Z92.21                    PERSONAL HISTORY OF ANTINEOPLASTIC CHEMO    
                                 

 

 2017                    LEELEE ROSALES N                    Ot           
         Z92.3                    PERSONAL HISTORY OF IRRADIATION              
                       

 

 2017                    BOBBY, LEELEE GUY                    Ot           
         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   
                                  

 

 2017                    BOBBYLEELEE N                    Ot           
         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     
                                

 

 2017                    BOBBY, BOBAN N                    Ot           
         E78.0                    PURE HYPERCHOLESTEROLEMIA                    
                 

 

 2017                    LEELEE ROSALES N                    Ot           
         G62.9                    POLYNEUROPATHY, UNSPECIFIED                  
                   

 

 2017                    LEELEE ROSALES N                    Ot           
         I10                    ESSENTIAL (PRIMARY) HYPERTENSION               
                      

 

 2017                    LEELEE ROSALES                    Ot           
         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      
                               

 

 2017                    LEELEE ROSALES N                    Ot           
         Z79.899                    OTHER LONG TERM (CURRENT) DRUG THERAPY     
                                

 

 2017                    LEELEE ROSALES N                    Ot           
         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    
                                 

 

 2017                    LEELEE ROSALES N                    Ot           
         Z92.21                    PERSONAL HISTORY OF ANTINEOPLASTIC CHEMO    
                                 

 

 2017                    LEELEE ROSALES N                    Ot           
         Z92.3                    PERSONAL HISTORY OF IRRADIATION              
                       

 

 2017                    LEELEE ROSALES N                    Ot           
         C50.111                    MALIGNANT NEOPLASM OF CENTRAL PORTION OF   
                                  

 

 2017                    LEELEE ROSALES N                    Ot           
         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    
                                 

 

 2017                    LEELEE ROSALES N                    Ot           
         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     
                                

 

 2017                    LEELEE ROSALES N                    Ot           
         E78.0                    PURE HYPERCHOLESTEROLEMIA                    
                 

 

 2017                    LEELEE ROSALES N                    Ot           
         I10                    ESSENTIAL (PRIMARY) HYPERTENSION               
                      

 

 2017                    LEELEE ROSALES                    Ot           
         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               
                      

 

 2017                    LEELEE ROSALES N                    Ot           
         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      
                               

 

 2017                    LEELEE ROSALES N                    Ot           
         Z79.899                    OTHER LONG TERM (CURRENT) DRUG THERAPY     
                                

 

 2017                    LEELEE ROSALES N                    Ot           
         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    
                                 

 

 2017                    LEELEE ROSALES N                    Ot           
         Z92.21                    PERSONAL HISTORY OF ANTINEOPLASTIC CHEMO    
                                 

 

 2017                    LEELEE ROSALES N                    Ot           
         Z92.3                    PERSONAL HISTORY OF IRRADIATION              
                       

 

 2017                    PRACHI CARRASCO FACBASIL, LENA FACP CCDS                    
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 
PORTION OF                                     

 

 2017                    PRACHI CARRASCO FACC, ALI FACP CCDS                    
Ot                    E11.9                    TYPE 2 DIABETES MELLITUS WITHOUT 
COMPLIC                                     

 

 2017                    PRACHI CARRASCO FACC, ALI FACP CCDS                    
Ot                    E78.0                    PURE HYPERCHOLESTEROLEMIA       
                              

 

 2017                    PRACHI CARRASCO FACC, ALI FACP CCDS                    
Ot                    I10                    ESSENTIAL (PRIMARY) HYPERTENSION  
                                   

 

 2017                    LEELEE ROSALES N                    Ot           
         C50.111                    MALIGNANT NEOPLASM OF CENTRAL PORTION OF   
                                  

 

 2017                    LEELEE ROSALES N                    Ot           
         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    
                                 

 

 2017                    LEELEE ROSALES N                    Ot           
         E78.0                    PURE HYPERCHOLESTEROLEMIA                    
                 

 

 2017                    LEELEE ROSALES N                    Ot           
         I10                    ESSENTIAL (PRIMARY) HYPERTENSION               
                      

 

 2017                    LEELEE ROSALES N                    Ot           
         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               
                      

 

 2017                    LEELEE ROSALES N                    Ot           
         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      
                               

 

 2017                    LEELEE ROSALES N                    Ot           
         Z79.899                    OTHER LONG TERM (CURRENT) DRUG THERAPY     
                                

 

 2017                    LEELEE ROSALES N                    Ot           
         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    
                                 

 

 2017                    LEELEE ROSALES N                    Ot           
         Z92.21                    PERSONAL HISTORY OF ANTINEOPLASTIC CHEMO    
                                 

 

 2017                    LEELEE ROSALES N                    Ot           
         Z92.3                    PERSONAL HISTORY OF IRRADIATION              
                       

 

 2017                    LEELEE ROSALES N                    Ot           
         C50.111                    MALIGNANT NEOPLASM OF CENTRAL PORTION OF   
                                  

 

 2017                    LEELEE ROSALES N                    Ot           
         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    
                                 

 

 2017                    LEELEE ROSALES N                    Ot           
         E78.00                    PURE HYPERCHOLESTEROLEMIA, UNSPECIFIED      
                               

 

 2017                    LEELEE ROSALES N                    Ot           
         G62.9                    POLYNEUROPATHY, UNSPECIFIED                  
                   

 

 2017                    LEELEE ROSALES N                    Ot           
         I10                    ESSENTIAL (PRIMARY) HYPERTENSION               
                      

 

 2017                    LEELEE ROSALES N                    Ot           
         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               
                      

 

 2017                    LEELEE ROSALES N                    Ot           
         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      
                               

 

 2017                    LEELEE ROSALES N                    Ot           
         Z79.899                    OTHER LONG TERM (CURRENT) DRUG THERAPY     
                                

 

 2017                    LEELEE ROSALES N                    Ot           
         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    
                                 

 

 2017                    LEELEE ROSALES N                    Ot           
         Z92.21                    PERSONAL HISTORY OF ANTINEOPLASTIC CHEMO    
                                 

 

 2017                    LEELEE ROSALES N                    Ot           
         Z92.3                    PERSONAL HISTORY OF IRRADIATION              
                       

 

 2017                    LEELEE ROSALES N                    Ot           
         Z12.31                    ENCNTR SCREEN MAMMOGRAM FOR MALIGNANT NE    
                                 

 

 2017                    BOBBY AMYKATE N                    Ot           
         Z85.3                    PERSONAL HISTORY OF MALIGNANT NEOPLASM O     
                                

 

 2017                    LEELEE ROSALES                    Ot           
         C50.111                    MALIGNANT NEOPLASM OF CENTRAL PORTION OF   
                                  

 

 2017                    LEELEE ROSALES                    Ot           
         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    
                                 

 

 2017                    LEELEE ROSALES N                    Ot           
         E78.00                    PURE HYPERCHOLESTEROLEMIA, UNSPECIFIED      
                               

 

 2017                    LEELEE ROSALES                    Ot           
         G62.9                    POLYNEUROPATHY, UNSPECIFIED                  
                   

 

 2017                    LEELEE ROSALES N                    Ot           
         I10                    ESSENTIAL (PRIMARY) HYPERTENSION               
                      

 

 2017                    LEELEE ROSALES                    Ot           
         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               
                      

 

 2017                    LEELEE ROSALES N                    Ot           
         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      
                               

 

 2017                    LEELEE ROSALES N                    Ot           
         Z79.899                    OTHER LONG TERM (CURRENT) DRUG THERAPY     
                                

 

 2017                    LEELEE ROSALES N                    Ot           
         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    
                                 

 

 2017                    LEELEE ROSALES N                    Ot           
         Z92.21                    PERSONAL HISTORY OF ANTINEOPLASTIC CHEMO    
                                 

 

 2017                    LEELEE ROSALES N                    Ot           
         Z92.3                    PERSONAL HISTORY OF IRRADIATION              
                       

 

 2017                    LEELEE ROSALES                    Ot           
         C50.111                    MALIGNANT NEOPLASM OF CENTRAL PORTION OF   
                                  

 

 2017                    LEELEE ROSALES N                    Ot           
         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    
                                 

 

 2017                    LEELEE ROSALES N                    Ot           
         E78.00                    PURE HYPERCHOLESTEROLEMIA, UNSPECIFIED      
                               

 

 2017                    LEELEE ROSALES N                    Ot           
         G62.9                    POLYNEUROPATHY, UNSPECIFIED                  
                   

 

 2017                    LEELEE ROSALES N                    Ot           
         I10                    ESSENTIAL (PRIMARY) HYPERTENSION               
                      

 

 2017                    LEELEE ROSALES N                    Ot           
         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      
                               

 

 2017                    LEELEE ROSALES N                    Ot           
         Z79.899                    OTHER LONG TERM (CURRENT) DRUG THERAPY     
                                

 

 2017                    LEELEE ROSALES N                    Ot           
         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    
                                 

 

 2017                    LEELEE ROSALES N                    Ot           
         Z92.21                    PERSONAL HISTORY OF ANTINEOPLASTIC CHEMO    
                                 

 

 2017                    LEELEE ROSALES N                    Ot           
         Z92.3                    PERSONAL HISTORY OF IRRADIATION              
                       



                                                                               
                                                                               
                                                                               
                                                                               
                                                                               
                                                                               
                                                                               
                                                                               
                                                                               
                                                                               
                                                                               
                                                                               
                                                                               
                                                                               
                                                                               
                                                                               
                                                                               
                                                                               
                                                                               
                                                                               
                                                                               
                                                                               
                                                                               
                                                                               
                                                                               
                                                                               
                                                                               
                                                                               
                                                                               
                                                                               
                                                                               
                                                                               
                                                                               
                                                                               
                                                                               
                                                                               
                                                                               
                                                                               
                                                                               
                                                                               
                                                                               
                                                                               
                                                                               
                                                                               
                                                                               
                                                                               
                                                                               
                                                                               
                                                                               
                                                                               
                                                                               
                                                                               
                                                                               
                                                                               
                                                                               
                                                                               
                                                                               
                                                                               
                                                                               
                                                                               
                                                                               
                                                                               
                                                                               
                                                                               
                                                                               
                                                                               
                                                                               
                                                                               
                                                                               
                                                                               
                                                                               
                                                                               
                                                                               
                                                                               
                                                                               
                                                                               
                                                                               
                                                                               
                                                                               
                                                                               
                                                                               
                                                                               
                                                                               
                                                                               
                                                                               
                                                                               
                                                                               
                                                                               
                                                                               
                                                                               
                                                                               
                                                                               
                                                                               
                                                                               
                                                                               
                                                                               
                                                                               
                                                                               
                                                                               
                                                                               
                                                                               
                                                                               
                                                                               
                                                                               
                                                                               
                                                                               
                                                                               
                                                                               
                                                                               
                                                                               
                                                                               
                                                                               
                                                                               
                                                                               
                                                                               
                                                                               
                                                                               
                                                                               
                                                                               
                                                                               
                                                                               
                                                                               
                                                                               
                                                                               
                                                                               
                                                                               
                                                                               
                                                                               
                                                                               
                                                                               
                                                                               
                                                                               
                                                                               
                                                                               
                                                                               
                                                                               
                                                                               
                                                                               
                                                                               
                                                                               
                                                                               
                                                                               
                                                                               
                                                                               
                                                                               
                                                                               
                                                                               
                                                                               
                                                                               
                                                                               
                                                                               
                                                                               
                                                                               
                                                                               
                                                                               
                                                                               
                                                                               
                                                                               
                                                                               
                                                                               
                                                                               
                                                                               
                                                                               
                                                                               
                                                                               
                                                                               
                                                                               
                                                                               
                                                                               
                                                                               
                                                                               
                                                                               
                                                                               
                     



Procedures

                                                                               
         



Results

                      





 Test                    Result                    Range                









 Methicillin resistant Staphylococcus aureus (MRSA) screening culture -  11:49                









 Methicillin resistant Staphylococcus aureus (MRSA) screening culture          
          NEG                     NRG                



                                                                              



Encounters

                      





 ACCT No.                    Visit Date/Time                    Discharge      
              Status                    Pt. Type                    Provider   
                 Facility                    Loc./Unit                    
Complaint                

 

 [id number]                    2017 11:31:00                    2017 11:55:00                    DIS                    Outpatient             
       CHRISTOS LAWLER MD                    [facility]
                    PREOP                    BREAST CANCER                

 

 [id number]                    2016 10:47:00                    2016 14:10:00                    DIS                    Outpatient             
       CHRISTOS LAWLER MD                    [facility]                    HISTORY OF POLYPS                

 

 [id number]                    2016 05:37:00                    2016 12:58:00                    DIS                    Outpatient             
       CHRISTOS LAWLER MD                    [facility]
                    PREOP                    HISTORY OF POLYPS                

 

 [id number]                    2016 09:59:00                    2016 00:01:00                    DIS                    Outpatient             
       LEELEE ROSALES                    [facility]     
               ONC                                     

 

 [id number]                    2015 10:25:00                    2015 00:01:00                    DIS                    Outpatient             
       LEELEE ROSALES                    [facility]     
               ONC                                     

 

 [id number]                    10/01/2015 10:21:00                    10/01/
2015 23:59:59                    CLS                    Outpatient             
       DANIA WALDRON                    [facility]                    ONC                                     

 

 [id number]                    2015 11:52:00                    2015 16:50:00                    DIS                    Outpatient             
       CHRISTOS LAWLER MD                    [facility]
                    SDC                    BREAST CA                

 

 [id number]                    2015 08:20:00                    2015 23:59:59                    CLS                    Outpatient             
       CHRISTOS LAWLER MD                    [facility]
                    PREOP                    HX BREAST CA                

 

 [id number]                    2015 11:19:00                    2015 23:59:59                    CLS                    Outpatient             
       LEELEE ROSALES                    [facility]     
               FS                                     

 

 [id number]                    2015 13:59:00                    2015 23:59:59                    CLS                    Outpatient             
       CHRISTOS LAWLER MD                    [facility]
                    RAD                    POST OP MASTECTOMY                

 

 [id number]                    2015 11:07:00                    2015 15:40:00                    DIS                    Outpatient             
       CHRISTOS LAWLER MD                    [facility]                    RIGHT BREAST CANCER                

 

 [id number]                    2015 06:41:00                    2015 23:59:59                    CLS                    Outpatient             
       CHRISTOS LAWLER MD                    [facility]
                    PREOP                    RIGHT BREAST CANCER                

 

 [id number]                    2015 09:48:00                    2015 12:20:00                    DIS                    Outpatient             
       CHRISTOS LAWLER MD                    [facility]                    SCREENING; ABNORMAL PET SCAN        
        

 

 [id number]                    07/10/2015 06:50:00                    07/10/
2015 23:59:59                    CLS                    Outpatient             
       CHRISTOS LAWLER MD                    [facility]
                    PREOP                    SCREENING; ABNORMAL PET SCAN      
          

 

 [id number]                    2015 07:27:00                    2015 23:59:59                    CLS                    Outpatient             
       PRACHI CARRASCO FACC, LENA CARRIZALES CCDS                    [facility]                    CARD                    HTN,DM,BREAST CA          
      

 

 [id number]                    2015 10:56:00                    2015 23:59:59                    CLS                    Outpatient             
       BOBBY BOBAN N                    [facility]     
               RAD                    BREAST CA                

 

 [id number]                    2015 09:25:00                    2015 23:59:59                    CLS                    Outpatient             
       BOBBY BOBAN N                    [facility]     
               CARD                    BREAST CA,                

 

 [id number]                    2015 10:18:00                    2015 23:59:59                    CLS                    Outpatient             
       BOBBY BOBAN N                    [facility]     
               FS                                     

 

 [id number]                    2015 10:35:00                    2015 23:59:59                    CLS                    Outpatient             
       BOBBY BOBAN N                    [facility]     
               RAD                    BREAST MASS                

 

 [id number]                    06/15/2015 14:07:00                    06/15/
2015 23:59:59                    CLS                    Outpatient             
       BOBBY BOBAN N                    [facility]     
               RAD                    BREAST CA                

 

 [id number]                    2014 10:08:00                    2014 23:59:59                    CLS                    Outpatient             
       BOBBY BOBAN N                    [facility]     
               FS                                     

 

 [id number]                    06/10/2014 11:34:00                    06/10/
2014 23:59:59                    CLS                    Outpatient             
       BOBBY, BOBAN N                    [facility]     
               RAD                    BREAST CA                

 

 [id number]                    2013 10:38:00                    2013 23:59:59                    CLS                    Outpatient             
       LEELEE ROSALES                    [facility]     
               FS                                     

 

 [id number]                    06/10/2013 13:33:00                    06/10/
2013 23:59:59                    CLS                    Outpatient             
       DANIA WALDRON                    [facility]                    RAD                    BREAST CA,ABN MAMMOGRAM    
            

 

 [id number]                    2013 09:15:00                    2013 23:59:59                    CLS                    Outpatient             
       LEELEE ROSALES                    [facility]     
               RAD                    BREAST CA,ANNUAL FOLLOW-UP                

 

 [id number]                    2013 12:04:00                    2013 23:59:59                    CLS                    Outpatient             
       LEELEE ROSALES                    [facility]     
               FS                                     

 

 [id number]                    2017 09:00:00                           
              PEN                    Preadmit                    CHRISTOS LAWLER MD                    [facility]                    BREAST CANCER                

 

 [id number]                    2017 13:29:00                           
              ACT                    Outpatient                    LEELEE ROSALES                    [facility]                    
FS                                     

 

 [id number]                    2017 10:54:00                           
              ACT                    Outpatient                    LEELEE ROSALES N                    [facility]                    
FS                                     

 

 [id number]                    2016 12:59:00                           
              ACT                    Outpatient                    LEELEE ROSALES N                    [facility]                    
RAD                    SCREENING                

 

 [id number]                    2016 12:59:00                           
              ACT                    Outpatient                    LEELEE ROSALES N                    [facility]                    
FS                                     

 

 [id number]                    2016 11:03:00                           
              ACT                    Outpatient                    LEELEE ROSALES N                    [facility]                    
FS                                     

 

 [id number]                    2016 11:32:00                           
              ACT                    Outpatient                    PRACHI CARRASCO 
FACC, LENA CARRIZALES CCDS                    [facility]        
            CARD                    C50.111                

 

 [id number]                    2016 11:32:00                           
                                   Document Registration                       
                                                                             

 

 [id number]                    2016 15:06:00                           
              ACT                    Outpatient                    LEELEE ROSALES                    [facility]                    
FS                                     

 

 [id number]                    2016 00:09:00                           
              PEN                    Preadmit                    LEELEE ROSALES                    [facility]                    ONC   
                                  

 

 [id number]                    2016 11:35:00                           
              ACT                    Outpatient                    LEELEE ROSALES MALENA                    [facility]                    
FS                                     

 

 [id number]                    2016 10:40:00                           
              ACT                    Outpatient                    DANIA WALDRON                    [facility]               
     ONC                                     

 

 [id number]                    2016 13:33:00                           
              ACT                    Outpatient                    DANIA WALDRON ARNP                    [facility]               
     ONC                                     

 

 [id number]                    2015 10:27:00                           
              ACT                    Outpatient                    DANIA WALDRON ARNP                    [facility]               
     ONC                                     

 

 [id number]                    2012 13:32:00                           
                                   Document Registration                       
                                                                             

 

 [id number]                    2012 08:46:00                           
                                   Document Registration                       
                                                                             

 

 [id number]                    2012 14:22:00                           
                                   Document Registration                       
                                                                             

 

 [id number]                    11/15/2011 13:27:00                           
                                   Document Registration                       
                                                                             

 

 [id number]                    2011 10:41:00                           
                                   Document Registration                       
                                                                             

 

 [id number]                    2011 09:36:00                           
                                   Document Registration                       
                                                                             

 

 [id number]                    2011 11:34:00                           
                                   Document Registration

## 2017-06-28 NOTE — PROGRESS NOTE-POST OPERATIVE
Post-Operative Progess Note


Surgeon (s)/Assistant (s)


Surgeon


CHRISTOS LAWLER MD


Assistant:  Not applicable





Pre-Operative Diagnosis


treated breast carcinoma





Post-Operative Diagnosis





Same





Procedure & Operative Findings


Date of Procedure


6/28/17


Procedure Performed/Findings


Removal of infusaport


Anesthesia Type


Sedation with local





Estimated Blood Loss


Estimated blood loss (mL):  Minimal





Specimens/Packing


Specimens Removed


None











CHRISTOS LAWLER MD Jun 28, 2017 11:06 am

## 2017-06-28 NOTE — PROGRESS NOTE-PRE OPERATIVE
Pre-Operative Progress Note


H&P Reviewed


The H&P was reviewed, patient examined and no changes noted.


Date Seen by Provider:  Jun 28, 2017


Time Seen by Provider:  10:38


Date H&P Reviewed:  Jun 28, 2017


Time H&P Reviewed:  10:38


Pre-Operative Diagnosis:  treated breast carcinoma











CHRISTOS LAWLER MD Jun 28, 2017 10:38 am

## 2017-06-28 NOTE — OPERATIVE REPORT
Operative Report


Date of Procedure/Surgery


Jun 28, 2017


Surgeon (s)


CHRISTOS LAWLER MD


Assistant (s):  Not applicable





Post-Operative Diagnosis





Same





Procedure Performed





removal of [id number]





Description of Procedure


Anesthesia Type:  MAC


Estimated blood loss (mL):  Minimal


Specimen(s) collected/removed


none


Description of the Procedure


1 g of Ancef was administered intravenously as prophylaxis against wound 

infection.  Sequential compression devices were placed around her legs, to 

minimize the risk of venous thrombosis.





Left infraclavicular region was prepared and draped in the usual sterile 

manner.  Local anesthesia was achieved using quarter percent Marcaine with 

epinephrine.   By making a secondary incision along the previous scar, port was 

removed without risking air embolism.  Incision was then closed using 3-0 

Vicryl for the subcutaneous tissue and 4-0 Vicryl for skin, in a subcuticular 

fashion.  A nonadherent dressing was then applied.





Tolerated procedure well and was taken to the recovery room in a stable 

condition


Findings of the Procedure


Iehlno-y-Wipd in satisfactory position





Allergies and Home Medications


Allergies


Coded Allergies:  


     acetaminophen (Verified  Allergy, Unknown, 10/1/15)


     atorvastatin (Verified  Allergy, Unknown, 10/1/15)


     hydrocodone (Verified  Allergy, Unknown, 10/1/15)


     ibuprofen (Verified  Allergy, Unknown, 10/1/15)


     loratadine (Verified  Allergy, Unknown, 10/1/15)


     morphine (Verified  Allergy, Unknown, 10/1/15)


     ofloxacin (Verified  Allergy, Unknown, 10/1/15)





Home Medications


Gabapentin 300 Mg Capsule, 300 MG PO TID, (Reported)


Glipizide 10 Mg Tablet, 10 MG PO HS, (Reported)


Levothyroxine Sodium 100 Mcg Tablet, 100 MCG PO DAILY, (Reported)


Lisinopril 10 Mg Tablet, 5 MG PO DAILY, (Reported)


   TAKE 1/2 OF (10MG) TAB 


Metformin Hcl 1,000 Mg Tablet, 1,000 MG PO HS, (Reported)


Sitagliptin Phosphate 100 Mg Tablet, 100 MG PO HS, (Reported)











CHRISTOS LAWLER MD Jun 28, 2017 11:08 am

## 2017-10-03 ENCOUNTER — HOSPITAL ENCOUNTER (OUTPATIENT)
Dept: HOSPITAL 75 - ONC | Age: 82
LOS: 90 days | Discharge: HOME | End: 2018-01-01
Attending: INTERNAL MEDICINE
Payer: MEDICARE

## 2017-10-03 DIAGNOSIS — E11.42: ICD-10-CM

## 2017-10-03 DIAGNOSIS — I10: ICD-10-CM

## 2017-10-03 DIAGNOSIS — E78.00: ICD-10-CM

## 2017-10-03 DIAGNOSIS — C50.111: Primary | ICD-10-CM

## 2017-10-03 DIAGNOSIS — Z92.21: ICD-10-CM

## 2017-10-03 DIAGNOSIS — E03.9: ICD-10-CM

## 2017-10-03 DIAGNOSIS — Z17.1: ICD-10-CM

## 2017-10-03 DIAGNOSIS — Z90.11: ICD-10-CM

## 2017-10-03 DIAGNOSIS — Z79.899: ICD-10-CM

## 2017-10-03 DIAGNOSIS — Z92.3: ICD-10-CM

## 2017-10-03 PROCEDURE — 84443 ASSAY THYROID STIM HORMONE: CPT

## 2017-10-03 PROCEDURE — 36415 COLL VENOUS BLD VENIPUNCTURE: CPT

## 2017-10-03 PROCEDURE — 99213 OFFICE O/P EST LOW 20 MIN: CPT

## 2017-12-28 ENCOUNTER — HOSPITAL ENCOUNTER (OUTPATIENT)
Dept: HOSPITAL 75 - RAD | Age: 82
End: 2017-12-28
Attending: NURSE PRACTITIONER
Payer: MEDICARE

## 2017-12-28 DIAGNOSIS — Z12.31: Primary | ICD-10-CM

## 2017-12-28 DIAGNOSIS — C50.111: ICD-10-CM

## 2018-04-03 ENCOUNTER — HOSPITAL ENCOUNTER (OUTPATIENT)
Dept: HOSPITAL 75 - ONC | Age: 83
LOS: 90 days | Discharge: HOME | End: 2018-07-02
Attending: INTERNAL MEDICINE
Payer: MEDICARE

## 2018-04-03 DIAGNOSIS — E03.9: ICD-10-CM

## 2018-04-03 DIAGNOSIS — Z92.3: ICD-10-CM

## 2018-04-03 DIAGNOSIS — Z90.11: ICD-10-CM

## 2018-04-03 DIAGNOSIS — Z17.1: ICD-10-CM

## 2018-04-03 DIAGNOSIS — E11.42: ICD-10-CM

## 2018-04-03 DIAGNOSIS — I10: ICD-10-CM

## 2018-04-03 DIAGNOSIS — Z79.899: ICD-10-CM

## 2018-04-03 DIAGNOSIS — E78.00: ICD-10-CM

## 2018-04-03 DIAGNOSIS — Z92.21: ICD-10-CM

## 2018-04-03 DIAGNOSIS — C50.111: Primary | ICD-10-CM

## 2018-04-03 DIAGNOSIS — K29.70: ICD-10-CM

## 2018-04-03 PROCEDURE — 99213 OFFICE O/P EST LOW 20 MIN: CPT

## 2018-10-03 ENCOUNTER — HOSPITAL ENCOUNTER (OUTPATIENT)
Dept: HOSPITAL 75 - ONC | Age: 83
LOS: 90 days | Discharge: HOME | End: 2019-01-01
Attending: INTERNAL MEDICINE
Payer: MEDICARE

## 2018-10-03 DIAGNOSIS — Z92.21: ICD-10-CM

## 2018-10-03 DIAGNOSIS — Z17.1: ICD-10-CM

## 2018-10-03 DIAGNOSIS — Z79.899: ICD-10-CM

## 2018-10-03 DIAGNOSIS — I10: ICD-10-CM

## 2018-10-03 DIAGNOSIS — E11.42: ICD-10-CM

## 2018-10-03 DIAGNOSIS — E03.9: ICD-10-CM

## 2018-10-03 DIAGNOSIS — C50.111: Primary | ICD-10-CM

## 2018-10-03 DIAGNOSIS — Z90.11: ICD-10-CM

## 2018-10-03 DIAGNOSIS — K29.70: ICD-10-CM

## 2018-10-03 DIAGNOSIS — E78.00: ICD-10-CM

## 2018-10-03 DIAGNOSIS — Z92.3: ICD-10-CM

## 2018-10-03 PROCEDURE — 99213 OFFICE O/P EST LOW 20 MIN: CPT

## 2019-01-02 ENCOUNTER — HOSPITAL ENCOUNTER (OUTPATIENT)
Dept: HOSPITAL 75 - RAD | Age: 84
End: 2019-01-02
Attending: NURSE PRACTITIONER
Payer: MEDICARE

## 2019-01-02 DIAGNOSIS — Z12.31: Primary | ICD-10-CM

## 2019-01-02 DIAGNOSIS — C50.111: ICD-10-CM

## 2019-01-02 NOTE — DIAGNOSTIC IMAGING REPORT
EXAMINATION: Unilateral left screening mammogram with 3D

tomosynthesis and computer-aided Detection (CAD) system.



INDICATION: Screening.



This study was compared to the prior exams of 12/28/2017,

12/27/2016, and 06/15/2015. The patient has had a prior

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 At this

time, there are no current complaints.



FINDINGS: The fibroglandular tissue in the left breast is

heterogeneously dense. This does limit the sensitivity of this

exam. Overall, there does not appear to have been any significant

change when compared to the prior study. There is no primary or

secondary sign of malignancy noted.



IMPRESSION: There is no evidence for malignancy.



ACR BI-RADS Category 1: Negative.

Result letter will be mailed to the patient.

Note:  At least 10% of breast cancer is not imaged by

mammography.



Dictated by: 



  Dictated on workstation # [id number]

## 2019-03-27 ENCOUNTER — HOSPITAL ENCOUNTER (OUTPATIENT)
Dept: HOSPITAL 75 - ONC | Age: 84
LOS: 90 days | Discharge: HOME | End: 2019-06-25
Attending: INTERNAL MEDICINE
Payer: MEDICARE

## 2019-03-27 DIAGNOSIS — Z92.21: ICD-10-CM

## 2019-03-27 DIAGNOSIS — E78.00: ICD-10-CM

## 2019-03-27 DIAGNOSIS — E03.9: ICD-10-CM

## 2019-03-27 DIAGNOSIS — Z79.899: ICD-10-CM

## 2019-03-27 DIAGNOSIS — Z17.1: ICD-10-CM

## 2019-03-27 DIAGNOSIS — I10: ICD-10-CM

## 2019-03-27 DIAGNOSIS — C50.111: Primary | ICD-10-CM

## 2019-03-27 DIAGNOSIS — K29.70: ICD-10-CM

## 2019-03-27 DIAGNOSIS — E11.42: ICD-10-CM

## 2019-03-27 DIAGNOSIS — Z92.3: ICD-10-CM

## 2019-03-27 DIAGNOSIS — Z90.11: ICD-10-CM

## 2019-03-27 PROCEDURE — 99213 OFFICE O/P EST LOW 20 MIN: CPT

## 2019-04-09 ENCOUNTER — HOSPITAL ENCOUNTER (OUTPATIENT)
Dept: HOSPITAL 75 - CARD | Age: 84
End: 2019-04-09
Attending: INTERNAL MEDICINE
Payer: MEDICARE

## 2019-04-09 VITALS — BODY MASS INDEX: 28.27 KG/M2 | WEIGHT: 144 LBS | HEIGHT: 60 IN

## 2019-04-09 VITALS — DIASTOLIC BLOOD PRESSURE: 62 MMHG | SYSTOLIC BLOOD PRESSURE: 157 MMHG

## 2019-04-09 VITALS — DIASTOLIC BLOOD PRESSURE: 56 MMHG | SYSTOLIC BLOOD PRESSURE: 152 MMHG

## 2019-04-09 DIAGNOSIS — E11.9: Primary | ICD-10-CM

## 2019-04-09 DIAGNOSIS — C50.111: ICD-10-CM

## 2019-04-09 DIAGNOSIS — R53.81: ICD-10-CM

## 2019-04-09 DIAGNOSIS — E78.5: ICD-10-CM

## 2019-04-09 DIAGNOSIS — I10: ICD-10-CM

## 2019-04-09 PROCEDURE — 78452 HT MUSCLE IMAGE SPECT MULT: CPT

## 2019-04-09 PROCEDURE — 93017 CV STRESS TEST TRACING ONLY: CPT

## 2019-04-09 NOTE — STRESS TEST
DATE OF SERVICE:  04/09/2019



RESTING AND POST REGADENOSON TECHNETIUM-99M TETROFOSMIN SPECT CT IMAGING 



ORDERING PHYSICIAN:

Dr. Perla.



PRIMARY PHYSICIAN:

Dr. Paul.



CLINICAL DIAGNOSES:

Malaise, hyperlipidemia, diabetes, hypertension.



Baseline images were carried out after injection of 10.07 mCi of technetium-99m

Tetrofosmin.  This was followed by 0.4 mg of regadenoson and 29.6 mCi of

technetium-99m Tetrofosmin for stress imaging.  The electrocardiogram showed

sinus rhythm at baseline.  It did not change significantly with the regadenoson

infusion.  The patient tolerated the procedure well.



Review of images at rest and following stress does not indicate any significant

perfusion defects consistent with significant myocardial ischemia or infarction.

 Gated images show normal global left ventricular systolic function, normal

regional wall motion.  Left ventricular ejection fraction is calculated to be

70%.



CONCLUSIONS:

1.  No evidence of significant myocardial ischemia or infarction on this study.

2.  Normal to hyperdynamic left ventricular systolic function with a calculated

ejection fraction 70%.

3.  Normal regional wall motion.





Job ID: [id number]

DocumentID: [id number]

Dictated Date:  04/09/2019 16:48:40

Transcription Date: 04/09/2019 20:12:27

Dictated By: LENA PERLA MD, MA, FACP, FACC,

## 2019-09-24 ENCOUNTER — HOSPITAL ENCOUNTER (OUTPATIENT)
Dept: HOSPITAL 75 - ONC | Age: 84
LOS: 90 days | Discharge: HOME | End: 2019-12-23
Attending: INTERNAL MEDICINE
Payer: MEDICARE

## 2019-09-24 DIAGNOSIS — K29.70: ICD-10-CM

## 2019-09-24 DIAGNOSIS — E03.9: ICD-10-CM

## 2019-09-24 DIAGNOSIS — I10: ICD-10-CM

## 2019-09-24 DIAGNOSIS — E78.00: ICD-10-CM

## 2019-09-24 DIAGNOSIS — Z17.1: ICD-10-CM

## 2019-09-24 DIAGNOSIS — Z92.21: ICD-10-CM

## 2019-09-24 DIAGNOSIS — Z79.899: ICD-10-CM

## 2019-09-24 DIAGNOSIS — C50.111: Primary | ICD-10-CM

## 2019-09-24 DIAGNOSIS — Z90.11: ICD-10-CM

## 2019-09-24 DIAGNOSIS — Z92.3: ICD-10-CM

## 2019-09-24 DIAGNOSIS — E11.42: ICD-10-CM

## 2019-09-24 LAB
ALBUMIN SERPL-MCNC: 4.2 GM/DL (ref 3.2–4.5)
ALP SERPL-CCNC: 85 U/L (ref 40–136)
ALT SERPL-CCNC: 10 U/L (ref 0–55)
BASOPHILS # BLD AUTO: 0 10^3/UL (ref 0–0.1)
BASOPHILS NFR BLD AUTO: 0 % (ref 0–10)
BILIRUB SERPL-MCNC: 0.6 MG/DL (ref 0.1–1)
BUN/CREAT SERPL: 15
CALCIUM SERPL-MCNC: 9.6 MG/DL (ref 8.5–10.1)
CHLORIDE SERPL-SCNC: 104 MMOL/L (ref 98–107)
CO2 SERPL-SCNC: 25 MMOL/L (ref 21–32)
CREAT SERPL-MCNC: 0.88 MG/DL (ref 0.6–1.3)
EOSINOPHIL # BLD AUTO: 0 10^3/UL (ref 0–0.3)
EOSINOPHIL NFR BLD AUTO: 0 % (ref 0–10)
ERYTHROCYTE [DISTWIDTH] IN BLOOD BY AUTOMATED COUNT: 13.8 % (ref 10–14.5)
GFR SERPLBLD BASED ON 1.73 SQ M-ARVRAT: > 60 ML/MIN
GLUCOSE SERPL-MCNC: 99 MG/DL (ref 70–105)
HCT VFR BLD CALC: 38 % (ref 35–52)
HGB BLD-MCNC: 12.5 G/DL (ref 11.5–16)
LYMPHOCYTES # BLD AUTO: 1.7 X 10^3 (ref 1–4)
LYMPHOCYTES NFR BLD AUTO: 23 % (ref 12–44)
MANUAL DIFFERENTIAL PERFORMED BLD QL: NO
MCH RBC QN AUTO: 29 PG (ref 25–34)
MCHC RBC AUTO-ENTMCNC: 33 G/DL (ref 32–36)
MCV RBC AUTO: 87 FL (ref 80–99)
MONOCYTES # BLD AUTO: 0.4 X 10^3 (ref 0–1)
MONOCYTES NFR BLD AUTO: 5 % (ref 0–12)
NEUTROPHILS # BLD AUTO: 5.1 X 10^3 (ref 1.8–7.8)
NEUTROPHILS NFR BLD AUTO: 71 % (ref 42–75)
PLATELET # BLD: 406 10^3/UL (ref 130–400)
PMV BLD AUTO: 9.3 FL (ref 7.4–10.4)
POTASSIUM SERPL-SCNC: 4.2 MMOL/L (ref 3.6–5)
PROT SERPL-MCNC: 7.3 GM/DL (ref 6.4–8.2)
SODIUM SERPL-SCNC: 137 MMOL/L (ref 135–145)
WBC # BLD AUTO: 7.2 10^3/UL (ref 4.3–11)

## 2019-09-24 PROCEDURE — 99213 OFFICE O/P EST LOW 20 MIN: CPT

## 2019-09-24 PROCEDURE — 80053 COMPREHEN METABOLIC PANEL: CPT

## 2019-09-24 PROCEDURE — 85025 COMPLETE CBC W/AUTO DIFF WBC: CPT

## 2019-09-24 PROCEDURE — 36415 COLL VENOUS BLD VENIPUNCTURE: CPT

## 2020-01-10 ENCOUNTER — HOSPITAL ENCOUNTER (OUTPATIENT)
Dept: HOSPITAL 75 - RAD | Age: 85
End: 2020-01-10
Attending: INTERNAL MEDICINE
Payer: MEDICARE

## 2020-01-10 DIAGNOSIS — C50.111: ICD-10-CM

## 2020-01-10 DIAGNOSIS — Z12.31: Primary | ICD-10-CM

## 2020-01-10 NOTE — DIAGNOSTIC IMAGING REPORT
INDICATION: Routine screening.



Comparison is made with prior mammogram 1/2/2019 and 12/28/2017.



2-D and 3-D unilateral left screening mammography was performed

with CAD.



Scattered fibroglandular densities in left breast are noted.

Benign parenchymal and vascular calcifications are noted. No mass

or malignant-appearing microcalcifications are seen. Left axilla

is unremarkable.



IMPRESSION: BI-RADS Category 2



No mammographic features suspicious for malignancy are

identified.



ACR BI-RADS Category 2: Benign findings.

Result letter will be mailed to the patient.

Note: At least 10% of breast cancer is not imaged by mammography.



Dictated by: 



  Dictated on workstation # [id number]

## 2020-03-06 ENCOUNTER — HOSPITAL ENCOUNTER (EMERGENCY)
Dept: HOSPITAL 75 - ER FS | Age: 85
Discharge: HOME | End: 2020-03-06
Payer: MEDICARE

## 2020-03-06 VITALS — BODY MASS INDEX: 27.47 KG/M2 | WEIGHT: 145.51 LBS | HEIGHT: 60.98 IN

## 2020-03-06 VITALS — SYSTOLIC BLOOD PRESSURE: 152 MMHG | DIASTOLIC BLOOD PRESSURE: 78 MMHG

## 2020-03-06 DIAGNOSIS — E03.9: ICD-10-CM

## 2020-03-06 DIAGNOSIS — E11.9: ICD-10-CM

## 2020-03-06 DIAGNOSIS — R68.84: ICD-10-CM

## 2020-03-06 DIAGNOSIS — I10: ICD-10-CM

## 2020-03-06 DIAGNOSIS — Z88.1: ICD-10-CM

## 2020-03-06 DIAGNOSIS — Z88.8: ICD-10-CM

## 2020-03-06 DIAGNOSIS — Z88.6: ICD-10-CM

## 2020-03-06 DIAGNOSIS — K02.9: Primary | ICD-10-CM

## 2020-03-06 DIAGNOSIS — Z79.84: ICD-10-CM

## 2020-03-06 DIAGNOSIS — Z88.5: ICD-10-CM

## 2020-03-06 DIAGNOSIS — Z85.3: ICD-10-CM

## 2020-03-06 PROCEDURE — 93005 ELECTROCARDIOGRAM TRACING: CPT

## 2020-03-06 NOTE — ED EENT
History of Present Illness


General


Chief Complaint:  Dental Problems/Pain


Stated Complaint:  POSS INFECTION IN GUMS


Source:  patient


Exam Limitations:  no limitations





History of Present Illness


Date Seen by Provider:  Mar 6, 2020


Time Seen by Provider:  18:35


Initial Comments


Left jaw and dental pain for 3 days, waxing and waning, but not resolving. Hx of

dental problems in the same area, but has not seen a Dentist for a couple years.





Denies neck pain, chest pain, shoulder pain.  Denies soa or radiation of pain to

Left arm.





Allergies and Home Medications


Allergies


Coded Allergies:  


     atorvastatin (Verified  Allergy, Unknown, 10/1/15)


     ibuprofen (Verified  Allergy, Unknown, 10/1/15)


     loratadine (Verified  Allergy, Unknown, 10/1/15)


     morphine (Verified  Allergy, Unknown, 10/1/15)


     ofloxacin (Verified  Allergy, Unknown, 10/1/15)





Home Medications


Gabapentin 300 Mg Capsule, 300 MG PO TID, (Reported)


Glipizide 10 Mg Tablet, 10 MG PO HS, (Reported)


Levothyroxine Sodium 100 Mcg Tablet, 100 MCG PO DAILY, (Reported)


Lisinopril 10 Mg Tablet, 5 MG PO DAILY, (Reported)


   TAKE 1/2 OF (10MG) TAB 


Metformin Hcl 1,000 Mg Tablet, 1,000 MG PO HS, (Reported)


Penicillin V Potassium 500 Mg Tablet, 500 MG PO TID


   Prescribed by: MINGO RUIZ on 3/6/20 1847


Sitagliptin Phosphate 100 Mg Tablet, 100 MG PO HS, (Reported)


Tramadol HCl 50 Mg Tablet, 50 MG PO Q12H PRN for PAIN-MILD TO MODERATE


   Prescribed by: CHRISTOS LAWLER on 6/28/17 1109


Tramadol HCl 50 Mg Tablet, 50 MG PO Q6H


   Prescribed by: MINGO RUIZ on 3/6/20 1847





Patient Home Medication List


Home Medication List Reviewed:  Yes





Review of Systems


Review of Systems


Constitutional:  see HPI; No fever, No malaise, No weakness


Mouth:  see HPI, pain, swelling (of gums), other (bad teeth, previous dental 

work on left side...upper and lower)


Throat:  denies pain, denies swelling, denies neck stiffness, denies hoarse


Respiratory:  No cough, No dyspnea on exertion, No short of breath


Cardiovascular:  No chest pain, No Hx of Intervention, No palpitations, No 

syncope


Gastrointestinal:  No abdominal pain, No nausea, No vomiting


Musculoskeletal:  No back pain, No neck pain


Skin:  see HPI; No change in color, No rash





Past Medical-Social-Family Hx


Past Med/Social Hx:  Reviewed Nursing Past Med/Soc Hx


Patient Social History


Alcohol Use:  Denies Use


Recreational Drug Use:  No


Smoking Status:  Never a Smoker


2nd Hand Smoke Exposure:  No


Recent Foreign Travel:  No


Contact w/Someone Who Travel:  No


Recent Hopitalizations:  No


Physical Abuse:  No


Sexual Abuse:  No


Mistreated:  No


Fear:  No





Immunizations Up To Date


Date of Pneumonia Vaccine:  Jul 13, 2010





Seasonal Allergies


Seasonal Allergies:  Yes





Past Medical History


Surgeries:  Yes (RIGHT BREAST LUMPECTOMY, BACK SX, RIGHT BREAST MASTECTOMY)


Gallbladder, Hysterectomy


Respiratory:  No


Cardiac:  Yes


Heart Murmur, Hypertension


Neurological:  No


Reproductive Disorders:  No


Genitourinary:  Yes


UTI-Chronic


Gastrointestinal:  No


Musculoskeletal:  Yes


Arthritis, Chronic Back Pain


Endocrine:  Yes


Hypothyroidsim, Diabetes, Non-Insulin dep


Hearing Impairment:  Hard of Hearing


Cancer:  Yes


Breast


Psychosocial:  No


Integumentary:  No


Blood Disorders:  No





Physical Exam


Vital Signs





Vital Signs - First Documented








 3/6/20





 18:27


 


Temp 36.4


 


Pulse 119


 


Resp 16


 


B/P (MAP) 152/78 (102)


 


Pulse Ox 100


 


O2 Delivery Room Air








Height, Weight, BMI


Height: 5'0.00"


Weight: 144lbs. 0.0oz. 65.020257fq; 28.1 BMI


Method:


General Appearance:  WD/WN, no apparent distress; 


   No mild distress


Eyes:  bilateral eye PERRL, bilateral eye EOMI


Ears:  bilateral ear auricle normal, bilateral ear canal normal, bilateral ear 

TM normal


Nose:  normal inspection; No sinus tenderness


Mouth/Throat:  pharynx normal, dental tenderness (left lower pre-molar (signifi

cant decay w moderate gum swelling and tenderness, without an abscess)); No 

excessive drooling, No mandibular swelling, No maxillary swelling, No pharynx 

swelling, No tongue swollen, No tonsillar exudate, No tonsillar swelling, No 

uvula swelling, No voice changes


Neck:  non-tender, supple; No limited range of motion, No lymphadenopathy (R), 

No lymphadenopathy (L)


Cardiovascular:  regular rate, rhythm, no edema, no JVD


Respiratory:  chest non-tender, lungs clear


Neurologic/Psychiatric:  alert, normal mood/affect


Skin:  normal color, warm/dry





Progress/Results/Core Measures


Results/Orders


My Orders





Orders - MINGO RUIZ DO


Ekg Tracing (3/6/20 18:38)





Vital Signs/I&O











 3/6/20





 18:27


 


Temp 36.4


 


Pulse 119


 


Resp 16


 


B/P (MAP) 152/78 (102)


 


Pulse Ox 100


 


O2 Delivery Room Air








Initial ECG Impression Time:  18:45


Initial ECG Rate:  87


Initial ECG Rhythm:  Normal Sinus


Initial ECG Intervals:  Normal


Initial ECG Impression:  Normal


Initial ECG Comparisson:  No Previous ECG Available





Departure


Impression





   Primary Impression:  


   Mandible pain


   Additional Impression:  


   Dental caries


Disposition:  01 HOME, SELF-CARE


Condition:  Stable





Departure-Patient Inst.


Referrals:  


SELF,KELLY CARRASCO (PCP/Family)


Primary Care Physician


Patient Instructions:  Tooth Decay, Adult (DC)


Scripts


Penicillin V Potassium (Penicillin V Potassium) 500 Mg Tablet


500 MG PO TID, #15 TAB


   Prov: MINGO RUIZ DO         3/6/20 


Tramadol HCl (Ultram) 50 Mg Tablet


50 MG PO Q6H, #10 TAB


   Prov: MINGO RUIZ DO         3/6/20











MINGO RUIZ DO          Mar 6, 2020 18:41

## 2020-05-13 ENCOUNTER — HOSPITAL ENCOUNTER (OUTPATIENT)
Dept: HOSPITAL 75 - ONC | Age: 85
End: 2020-05-13
Attending: INTERNAL MEDICINE
Payer: MEDICARE

## 2020-05-13 DIAGNOSIS — Z92.21: ICD-10-CM

## 2020-05-13 DIAGNOSIS — E11.42: ICD-10-CM

## 2020-05-13 DIAGNOSIS — E78.00: ICD-10-CM

## 2020-05-13 DIAGNOSIS — Z17.1: ICD-10-CM

## 2020-05-13 DIAGNOSIS — E03.9: ICD-10-CM

## 2020-05-13 DIAGNOSIS — Z92.3: ICD-10-CM

## 2020-05-13 DIAGNOSIS — Z79.899: ICD-10-CM

## 2020-05-13 DIAGNOSIS — I10: ICD-10-CM

## 2020-05-13 DIAGNOSIS — C50.111: Primary | ICD-10-CM

## 2020-05-13 DIAGNOSIS — K29.70: ICD-10-CM

## 2020-05-13 LAB
ALBUMIN SERPL-MCNC: 4.1 GM/DL (ref 3.2–4.5)
ALP SERPL-CCNC: 72 U/L (ref 40–136)
ALT SERPL-CCNC: 11 U/L (ref 0–55)
BASOPHILS # BLD AUTO: 0 10^3/UL (ref 0–0.1)
BASOPHILS NFR BLD AUTO: 0 % (ref 0–10)
BILIRUB SERPL-MCNC: 0.4 MG/DL (ref 0.1–1)
BUN/CREAT SERPL: 16
CALCIUM SERPL-MCNC: 9.6 MG/DL (ref 8.5–10.1)
CHLORIDE SERPL-SCNC: 103 MMOL/L (ref 98–107)
CO2 SERPL-SCNC: 24 MMOL/L (ref 21–32)
CREAT SERPL-MCNC: 1.09 MG/DL (ref 0.6–1.3)
EOSINOPHIL # BLD AUTO: 0 10^3/UL (ref 0–0.3)
EOSINOPHIL NFR BLD AUTO: 0 % (ref 0–10)
ERYTHROCYTE [DISTWIDTH] IN BLOOD BY AUTOMATED COUNT: 13.4 % (ref 10–14.5)
GFR SERPLBLD BASED ON 1.73 SQ M-ARVRAT: 48 ML/MIN
GLUCOSE SERPL-MCNC: 153 MG/DL (ref 70–105)
HCT VFR BLD CALC: 37 % (ref 35–52)
HGB BLD-MCNC: 12.3 G/DL (ref 11.5–16)
LYMPHOCYTES # BLD AUTO: 1.2 X 10^3 (ref 1–4)
LYMPHOCYTES NFR BLD AUTO: 13 % (ref 12–44)
MANUAL DIFFERENTIAL PERFORMED BLD QL: NO
MCH RBC QN AUTO: 29 PG (ref 25–34)
MCHC RBC AUTO-ENTMCNC: 33 G/DL (ref 32–36)
MCV RBC AUTO: 87 FL (ref 80–99)
MONOCYTES # BLD AUTO: 0.4 X 10^3 (ref 0–1)
MONOCYTES NFR BLD AUTO: 5 % (ref 0–12)
NEUTROPHILS # BLD AUTO: 7.5 X 10^3 (ref 1.8–7.8)
NEUTROPHILS NFR BLD AUTO: 82 % (ref 42–75)
PLATELET # BLD: 417 10^3/UL (ref 130–400)
PMV BLD AUTO: 9.5 FL (ref 7.4–10.4)
POTASSIUM SERPL-SCNC: 4 MMOL/L (ref 3.6–5)
PROT SERPL-MCNC: 7 GM/DL (ref 6.4–8.2)
SODIUM SERPL-SCNC: 137 MMOL/L (ref 135–145)
WBC # BLD AUTO: 9.3 10^3/UL (ref 4.3–11)

## 2020-05-13 PROCEDURE — 99213 OFFICE O/P EST LOW 20 MIN: CPT

## 2020-05-13 PROCEDURE — 85025 COMPLETE CBC W/AUTO DIFF WBC: CPT

## 2020-05-13 PROCEDURE — 80053 COMPREHEN METABOLIC PANEL: CPT

## 2020-11-02 ENCOUNTER — HOSPITAL ENCOUNTER (OUTPATIENT)
Dept: HOSPITAL 75 - ONC | Age: 85
End: 2020-11-02
Attending: INTERNAL MEDICINE
Payer: MEDICARE

## 2020-11-02 DIAGNOSIS — C50.111: Primary | ICD-10-CM

## 2020-11-02 LAB
ALBUMIN SERPL-MCNC: 4 GM/DL (ref 3.2–4.5)
ALP SERPL-CCNC: 80 U/L (ref 40–136)
ALT SERPL-CCNC: 15 U/L (ref 0–55)
BASOPHILS # BLD AUTO: 0 10^3/UL (ref 0–0.1)
BASOPHILS NFR BLD AUTO: 0 % (ref 0–10)
BILIRUB SERPL-MCNC: 0.4 MG/DL (ref 0.1–1)
BUN/CREAT SERPL: 17
CALCIUM SERPL-MCNC: 9.1 MG/DL (ref 8.5–10.1)
CHLORIDE SERPL-SCNC: 105 MMOL/L (ref 98–107)
CO2 SERPL-SCNC: 21 MMOL/L (ref 21–32)
CREAT SERPL-MCNC: 1.13 MG/DL (ref 0.6–1.3)
EOSINOPHIL # BLD AUTO: 0 10^3/UL (ref 0–0.3)
EOSINOPHIL NFR BLD AUTO: 1 % (ref 0–10)
GFR SERPLBLD BASED ON 1.73 SQ M-ARVRAT: 46 ML/MIN
GLUCOSE SERPL-MCNC: 252 MG/DL (ref 70–105)
HCT VFR BLD CALC: 37 % (ref 35–52)
HGB BLD-MCNC: 12 G/DL (ref 11.5–16)
LYMPHOCYTES # BLD AUTO: 1.3 10^3/UL (ref 1–4)
LYMPHOCYTES NFR BLD AUTO: 18 % (ref 12–44)
MANUAL DIFFERENTIAL PERFORMED BLD QL: NO
MCH RBC QN AUTO: 29 PG (ref 25–34)
MCHC RBC AUTO-ENTMCNC: 32 G/DL (ref 32–36)
MCV RBC AUTO: 89 FL (ref 80–99)
MONOCYTES # BLD AUTO: 0.3 10^3/UL (ref 0–1)
MONOCYTES NFR BLD AUTO: 5 % (ref 0–12)
NEUTROPHILS # BLD AUTO: 5.5 10^3/UL (ref 1.8–7.8)
NEUTROPHILS NFR BLD AUTO: 75 % (ref 42–75)
PLATELET # BLD: 382 10^3/UL (ref 130–400)
PMV BLD AUTO: 9.9 FL (ref 9–12.2)
POTASSIUM SERPL-SCNC: 4.4 MMOL/L (ref 3.6–5)
PROT SERPL-MCNC: 6.9 GM/DL (ref 6.4–8.2)
SODIUM SERPL-SCNC: 137 MMOL/L (ref 135–145)
WBC # BLD AUTO: 7.4 10^3/UL (ref 4.3–11)

## 2020-11-02 PROCEDURE — 80053 COMPREHEN METABOLIC PANEL: CPT

## 2020-11-02 PROCEDURE — 85025 COMPLETE CBC W/AUTO DIFF WBC: CPT

## 2020-11-02 PROCEDURE — 99213 OFFICE O/P EST LOW 20 MIN: CPT

## 2021-01-11 ENCOUNTER — HOSPITAL ENCOUNTER (OUTPATIENT)
Dept: HOSPITAL 75 - RAD | Age: 86
End: 2021-01-11
Attending: NURSE PRACTITIONER
Payer: MEDICARE

## 2021-01-11 DIAGNOSIS — C50.111: Primary | ICD-10-CM

## 2021-01-11 PROCEDURE — 77065 DX MAMMO INCL CAD UNI: CPT

## 2021-01-11 PROCEDURE — 76642 ULTRASOUND BREAST LIMITED: CPT

## 2021-01-11 NOTE — DIAGNOSTIC IMAGING REPORT
EXAMINATION:

Diagnostic left mammogram with CAD.



INDICATION: 

Breast CA.



COMPARISON: 

This study was compared to the prior exams of 01/10/2020,

01/02/2019, and 12/28/2017.



FINDINGS:

By history, the patient has had mastectomy on the right for

carcinoma in 2015. She now complains of a soft tissue fullness in

the superior aspect of the breast. A marker was placed over the

area of concern but there is no discrete abnormality evident in

this area. Even so, I would recommend that ultrasound be

performed for further study.



There are scattered fibroglandular densities in the left breast

which could obscure a lesion. Overall, there does not appear to

have been any significant change when compared to the prior

study. There is no primary or secondary sign of malignancy noted.



IMPRESSION: 

There is no evidence for malignancy; however, ultrasound of the

left breast would be recommended for further evaluation. 

 

ACR BI-RADS Category 0: Incomplete. (Needs additional imaging

evaluation).

Result letter will be mailed to the patient.

Note: At least 10% of breast cancer is not imaged by mammography.



Dictated by: 



  Dictated on workstation # [id number]

## 2021-01-11 NOTE — DIAGNOSTIC IMAGING REPORT
EXAMINATION:

Ultrasound left breast limited. 



INDICATION:

Left breast fullness.



FINDINGS:

Reportedly, the patient has a soft tissue fullness in the

superior aspect of the left breast. The diagnostic mammogram

performed prior to this study failed to show any abnormality in

this region. On this study, there is no discrete solid or cystic

mass evident. There is no sign of an abscess either.



IMPRESSION:

There is no abnormality in the left breast to correspond to the

patient's area of fullness. Clinical followup is recommended.



ACR BI-RADS Category 1: Negative.



Dictated by: 



  Dictated on workstation # [id number]

## 2021-04-16 ENCOUNTER — HOSPITAL ENCOUNTER (EMERGENCY)
Dept: HOSPITAL 75 - ER FS | Age: 86
Discharge: HOME | End: 2021-04-16
Payer: MEDICARE

## 2021-04-16 VITALS — DIASTOLIC BLOOD PRESSURE: 80 MMHG | SYSTOLIC BLOOD PRESSURE: 164 MMHG

## 2021-04-16 DIAGNOSIS — Z88.5: ICD-10-CM

## 2021-04-16 DIAGNOSIS — E11.9: ICD-10-CM

## 2021-04-16 DIAGNOSIS — Z88.6: ICD-10-CM

## 2021-04-16 DIAGNOSIS — E03.9: ICD-10-CM

## 2021-04-16 DIAGNOSIS — Z88.8: ICD-10-CM

## 2021-04-16 DIAGNOSIS — Z79.890: ICD-10-CM

## 2021-04-16 DIAGNOSIS — Z88.1: ICD-10-CM

## 2021-04-16 DIAGNOSIS — T16.2XXA: Primary | ICD-10-CM

## 2021-04-16 DIAGNOSIS — I10: ICD-10-CM

## 2021-04-16 DIAGNOSIS — Z79.84: ICD-10-CM

## 2021-04-16 PROCEDURE — 99282 EMERGENCY DEPT VISIT SF MDM: CPT

## 2021-04-16 NOTE — ED EENT
History of Present Illness


General


Chief Complaint:  Foreign Body


Stated Complaint:  LT EAR PROBLEMS





History of Present Illness


Date Seen by Provider:  Apr 16, 2021


Time Seen by Provider:  18:47


Initial Comments


85-year-old female presents with foreign body in her left ear.  Patient reports 

that she has portion of her hearing aid that came off into her ear canal.  Kwame pereyra has some moderate discomfort.  Happened earlier today.  No other systemic 

complaints.





Allergies and Home Medications


Allergies


Coded Allergies:  


     atorvastatin (Verified  Allergy, Unknown, 10/1/15)


     ibuprofen (Verified  Allergy, Unknown, 10/1/15)


     loratadine (Verified  Allergy, Unknown, 10/1/15)


     morphine (Verified  Allergy, Unknown, 10/1/15)


     ofloxacin (Verified  Allergy, Unknown, 10/1/15)





Home Medications


Gabapentin 300 Mg Capsule, 300 MG PO TID, (Reported)


Glipizide 10 Mg Tablet, 10 MG PO HS, (Reported)


Levothyroxine Sodium 100 Mcg Tablet, 100 MCG PO DAILY, (Reported)


Lisinopril 10 Mg Tablet, 5 MG PO DAILY, (Reported)


   TAKE 1/2 OF (10MG) TAB 


Metformin Hcl 1,000 Mg Tablet, 1,000 MG PO HS, (Reported)


Penicillin V Potassium 500 Mg Tablet, 500 MG PO TID


   Prescribed by: MINGO RUIZ on 3/6/20 1847


Sitagliptin Phosphate 100 Mg Tablet, 100 MG PO HS, (Reported)


Tramadol HCl 50 Mg Tablet, 50 MG PO Q12H PRN for PAIN-MILD TO MODERATE


   Prescribed by: CHRISTOS LAWLER on 6/28/17 1109


Tramadol HCl 50 Mg Tablet, 50 MG PO Q6H


   Prescribed by: MINGO RUIZ on 3/6/20 1847





Patient Home Medication List


Home Medication List Reviewed:  Yes





Review of Systems


Review of Systems


Constitutional:  no symptoms reported


Eyes:  No Symptoms Reported


Ears:  See HPI


Nose:  no symptoms reported


Mouth:  no symptoms reported


Throat:  no symptoms reported


Respiratory:  no symptoms reported


Cardiovascular:  no symptoms reported


Gastrointestinal:  no symptoms reported





Past Medical-Social-Family Hx


Past Med/Social Hx:  Reviewed Nursing Past Med/Soc Hx


Patient Social History


2nd Hand Smoke Exposure:  No


Recent Hopitalizations:  No





Immunizations Up To Date


Date of Pneumonia Vaccine:  Jul 13, 2010





Seasonal Allergies


Seasonal Allergies:  Yes





Past Medical History


Surgeries:  Yes (RIGHT BREAST LUMPECTOMY, BACK SX, RIGHT BREAST MASTECTOMY)


Gallbladder, Hysterectomy


Respiratory:  No


Cardiac:  Yes


Heart Murmur, Hypertension


Neurological:  No


Reproductive Disorders:  No


Genitourinary:  Yes


UTI-Chronic


Gastrointestinal:  No


Musculoskeletal:  Yes


Arthritis, Chronic Back Pain


Endocrine:  Yes


Hypothyroidsim, Diabetes, Non-Insulin dep


Hearing Impairment:  Hard of Hearing


Cancer:  Yes


Breast


Psychosocial:  No


Integumentary:  No


Blood Disorders:  No





Physical Exam


Vital Signs





Vital Signs - First Documented








 4/16/21





 18:48


 


Temp 36.3


 


Pulse 91


 


Resp 16


 


B/P (MAP) 164/80 (108)


 


Pulse Ox 99








Height, Weight, BMI


Height: 5'0.00"


Weight: 144lbs. 0.0oz. 65.670951mg; 27.00 BMI


Method:


General Appearance:  no apparent distress


Ears:  left ear foreign body (Small plastic portion of hearing aid hearing aid)


Cardiovascular:  normal peripheral pulses, no edema


Respiratory:  no respiratory distress, no accessory muscle use


Neurologic/Psychiatric:  alert, oriented x 3


Skin:  normal color, warm/dry





Procedures/Interventions


Ear :  


   Ear Location:  Left


   Foreign Body Removal:  FB in the Ear Canal


Progress/Conclusion


Small soft plastic ear piece of hearing aid was removed with alligator clamp.  

Patient tolerated well with no immediate complications, recheck with otoscope 

following removal shows canal with no damage and no further foreign bodies, 

normal TM





Progress/Results/Core Measures


Results/Orders


Vital Signs/I&O











 4/16/21





 18:48


 


Temp 36.3


 


Pulse 91


 


Resp 16


 


B/P (MAP) 164/80 (108)


 


Pulse Ox 99











Departure


Impression





   Primary Impression:  


   Foreign body in ear


   Qualified Codes:  T16.2XXA - Foreign body in left ear, initial encounter


Disposition:  01 HOME, SELF-CARE


Condition:  Stable





Departure-Patient Inst.


Referrals:  


SELF,KELLY CARRASCO (PCP/Family)


Primary Care Physician





Add. Discharge Instructions:  








All discharge instructions reviewed with patient and/or family. Voiced 

understanding.











LEONIDES LUCIO DO               Apr 16, 2021 18:52

## 2021-05-03 ENCOUNTER — HOSPITAL ENCOUNTER (OUTPATIENT)
Dept: HOSPITAL 75 - ONC | Age: 86
End: 2021-05-03
Attending: INTERNAL MEDICINE
Payer: MEDICARE

## 2021-05-03 DIAGNOSIS — Z92.21: ICD-10-CM

## 2021-05-03 DIAGNOSIS — C50.119: Primary | ICD-10-CM

## 2021-05-03 DIAGNOSIS — I65.23: ICD-10-CM

## 2021-05-03 LAB
ALBUMIN SERPL-MCNC: 4 GM/DL (ref 3.2–4.5)
ALP SERPL-CCNC: 83 U/L (ref 40–136)
ALT SERPL-CCNC: 12 U/L (ref 0–55)
BASOPHILS # BLD AUTO: 0 10^3/UL (ref 0–0.1)
BASOPHILS NFR BLD AUTO: 0 % (ref 0–10)
BILIRUB SERPL-MCNC: 0.5 MG/DL (ref 0.1–1)
BUN/CREAT SERPL: 15
CALCIUM SERPL-MCNC: 9.1 MG/DL (ref 8.5–10.1)
CHLORIDE SERPL-SCNC: 103 MMOL/L (ref 98–107)
CO2 SERPL-SCNC: 27 MMOL/L (ref 21–32)
CREAT SERPL-MCNC: 1.23 MG/DL (ref 0.6–1.3)
EOSINOPHIL # BLD AUTO: 0.1 10^3/UL (ref 0–0.3)
EOSINOPHIL NFR BLD AUTO: 1 % (ref 0–10)
GFR SERPLBLD BASED ON 1.73 SQ M-ARVRAT: 41 ML/MIN
GLUCOSE SERPL-MCNC: 159 MG/DL (ref 70–105)
HCT VFR BLD CALC: 39 % (ref 35–52)
HGB BLD-MCNC: 12.3 G/DL (ref 11.5–16)
LYMPHOCYTES # BLD AUTO: 1.6 10^3/UL (ref 1–4)
LYMPHOCYTES NFR BLD AUTO: 19 % (ref 12–44)
MANUAL DIFFERENTIAL PERFORMED BLD QL: NO
MCH RBC QN AUTO: 28 PG (ref 25–34)
MCHC RBC AUTO-ENTMCNC: 32 G/DL (ref 32–36)
MCV RBC AUTO: 89 FL (ref 80–99)
MONOCYTES # BLD AUTO: 0.3 10^3/UL (ref 0–1)
MONOCYTES NFR BLD AUTO: 4 % (ref 0–12)
NEUTROPHILS # BLD AUTO: 6.2 10^3/UL (ref 1.8–7.8)
NEUTROPHILS NFR BLD AUTO: 75 % (ref 42–75)
PLATELET # BLD: 373 10^3/UL (ref 130–400)
PMV BLD AUTO: 9.6 FL (ref 9–12.2)
POTASSIUM SERPL-SCNC: 4.2 MMOL/L (ref 3.6–5)
PROT SERPL-MCNC: 6.9 GM/DL (ref 6.4–8.2)
SODIUM SERPL-SCNC: 137 MMOL/L (ref 135–145)
WBC # BLD AUTO: 8.2 10^3/UL (ref 4.3–11)

## 2021-05-03 PROCEDURE — 99213 OFFICE O/P EST LOW 20 MIN: CPT

## 2021-05-03 PROCEDURE — 85025 COMPLETE CBC W/AUTO DIFF WBC: CPT

## 2021-05-03 PROCEDURE — 80053 COMPREHEN METABOLIC PANEL: CPT

## 2022-05-21 ENCOUNTER — HOSPITAL ENCOUNTER (INPATIENT)
Dept: HOSPITAL 75 - ER FS | Age: 87
LOS: 4 days | Discharge: HOME HEALTH | DRG: 871 | End: 2022-05-25
Attending: FAMILY MEDICINE | Admitting: INTERNAL MEDICINE
Payer: MEDICARE

## 2022-05-21 VITALS — SYSTOLIC BLOOD PRESSURE: 165 MMHG | DIASTOLIC BLOOD PRESSURE: 68 MMHG

## 2022-05-21 VITALS — HEIGHT: 58.98 IN | WEIGHT: 293 LBS | BODY MASS INDEX: 59.07 KG/M2

## 2022-05-21 VITALS — SYSTOLIC BLOOD PRESSURE: 149 MMHG | DIASTOLIC BLOOD PRESSURE: 66 MMHG

## 2022-05-21 DIAGNOSIS — Z90.11: ICD-10-CM

## 2022-05-21 DIAGNOSIS — Z88.5: ICD-10-CM
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
## 2022-05-21 DIAGNOSIS — M19.91: ICD-10-CM

## 2022-05-21 DIAGNOSIS — E83.42: ICD-10-CM

## 2022-05-21 DIAGNOSIS — N30.90: ICD-10-CM

## 2022-05-21 DIAGNOSIS — E11.9: ICD-10-CM

## 2022-05-21 DIAGNOSIS — A41.9: Primary | ICD-10-CM

## 2022-05-21 DIAGNOSIS — Z85.3: ICD-10-CM

## 2022-05-21 DIAGNOSIS — Z88.8: ICD-10-CM

## 2022-05-21 DIAGNOSIS — Z20.822: ICD-10-CM

## 2022-05-21 DIAGNOSIS — I10: ICD-10-CM

## 2022-05-21 DIAGNOSIS — E03.9: ICD-10-CM

## 2022-05-21 DIAGNOSIS — J18.9: ICD-10-CM

## 2022-05-21 DIAGNOSIS — H91.90: ICD-10-CM

## 2022-05-21 DIAGNOSIS — Z79.84: ICD-10-CM

## 2022-05-21 DIAGNOSIS — Z88.6: ICD-10-CM

## 2022-05-21 DIAGNOSIS — Z88.1: ICD-10-CM

## 2022-05-21 DIAGNOSIS — E87.1: ICD-10-CM

## 2022-05-21 DIAGNOSIS — S00.12XA: ICD-10-CM

## 2022-05-21 LAB
ALBUMIN SERPL-MCNC: 3.8 GM/DL (ref 3.2–4.5)
ALP SERPL-CCNC: 99 U/L (ref 40–136)
ALT SERPL-CCNC: 10 U/L (ref 0–55)
APTT BLD: 33 SEC (ref 24–35)
APTT PPP: YELLOW S
BACTERIA #/AREA URNS HPF: (no result) /HPF
BASOPHILS # BLD AUTO: 0 10^3/UL (ref 0–0.1)
BASOPHILS NFR BLD AUTO: 0 % (ref 0–10)
BILIRUB SERPL-MCNC: 0.9 MG/DL (ref 0.1–1)
BILIRUB UR QL STRIP: (no result)
BUN/CREAT SERPL: 14
BUN/CREAT SERPL: 14
CALCIUM SERPL-MCNC: 8.7 MG/DL (ref 8.5–10.1)
CALCIUM SERPL-MCNC: 9.4 MG/DL (ref 8.5–10.1)
CHLORIDE SERPL-SCNC: 92 MMOL/L (ref 98–107)
CHLORIDE SERPL-SCNC: 99 MMOL/L (ref 98–107)
CO2 SERPL-SCNC: 18 MMOL/L (ref 21–32)
CO2 SERPL-SCNC: 20 MMOL/L (ref 21–32)
CREAT SERPL-MCNC: 0.8 MG/DL (ref 0.6–1.3)
CREAT SERPL-MCNC: 1.01 MG/DL (ref 0.6–1.3)
EOSINOPHIL # BLD AUTO: 0 10^3/UL (ref 0–0.3)
EOSINOPHIL NFR BLD AUTO: 0 % (ref 0–10)
FIBRINOGEN PPP-MCNC: CLEAR MG/DL
GFR SERPLBLD BASED ON 1.73 SQ M-ARVRAT: 54 ML/MIN
GFR SERPLBLD BASED ON 1.73 SQ M-ARVRAT: 72 ML/MIN
GLUCOSE SERPL-MCNC: 150 MG/DL (ref 70–105)
GLUCOSE SERPL-MCNC: 219 MG/DL (ref 70–105)
GLUCOSE UR STRIP-MCNC: (no result) MG/DL
GRAN CASTS #/AREA URNS LPF: (no result) /LPF
HCT VFR BLD CALC: 37 % (ref 35–52)
HGB BLD-MCNC: 12.6 G/DL (ref 11.5–16)
INR PPP: 1 (ref 0.8–1.4)
KETONES UR QL STRIP: (no result)
LEUKOCYTE ESTERASE UR QL STRIP: (no result)
LIPASE SERPL-CCNC: 25 U/L (ref 8–78)
LYMPHOCYTES # BLD AUTO: 0.7 10^3/UL (ref 1–4)
LYMPHOCYTES NFR BLD AUTO: 6 % (ref 12–44)
MAGNESIUM SERPL-MCNC: 1.4 MG/DL (ref 1.6–2.4)
MANUAL DIFFERENTIAL PERFORMED BLD QL: YES
MCH RBC QN AUTO: 28 PG (ref 25–34)
MCHC RBC AUTO-ENTMCNC: 34 G/DL (ref 32–36)
MCV RBC AUTO: 83 FL (ref 80–99)
MONOCYTES # BLD AUTO: 0.7 10^3/UL (ref 0–1)
MONOCYTES NFR BLD AUTO: 5 % (ref 0–12)
MONOCYTES NFR BLD: 1 %
NEUTROPHILS # BLD AUTO: 10.9 10^3/UL (ref 1.8–7.8)
NEUTROPHILS NFR BLD AUTO: 88 % (ref 42–75)
NEUTS BAND NFR BLD MANUAL: 92 %
NITRITE UR QL STRIP: NEGATIVE
PH UR STRIP: 6 [PH] (ref 5–9)
PLATELET # BLD: 423 10^3/UL (ref 130–400)
PMV BLD AUTO: 9.3 FL (ref 9–12.2)
POTASSIUM SERPL-SCNC: 3.6 MMOL/L (ref 3.6–5)
POTASSIUM SERPL-SCNC: 4.1 MMOL/L (ref 3.6–5)
PROT SERPL-MCNC: 8 GM/DL (ref 6.4–8.2)
PROT UR QL STRIP: (no result)
PROTHROMBIN TIME: 13.2 SEC (ref 12.2–14.7)
RBC #/AREA URNS HPF: (no result) /HPF
SODIUM SERPL-SCNC: 128 MMOL/L (ref 135–145)
SODIUM SERPL-SCNC: 132 MMOL/L (ref 135–145)
SP GR UR STRIP: 1.02 (ref 1.02–1.02)
SQUAMOUS #/AREA URNS HPF: (no result) /HPF
VARIANT LYMPHS NFR BLD MANUAL: 7 %
WBC # BLD AUTO: 12.4 10^3/UL (ref 4.3–11)
WBC #/AREA URNS HPF: (no result) /HPF

## 2022-05-21 PROCEDURE — 84145 PROCALCITONIN (PCT): CPT

## 2022-05-21 PROCEDURE — 80048 BASIC METABOLIC PNL TOTAL CA: CPT

## 2022-05-21 PROCEDURE — 93041 RHYTHM ECG TRACING: CPT

## 2022-05-21 PROCEDURE — 71045 X-RAY EXAM CHEST 1 VIEW: CPT

## 2022-05-21 PROCEDURE — 94640 AIRWAY INHALATION TREATMENT: CPT

## 2022-05-21 PROCEDURE — 85730 THROMBOPLASTIN TIME PARTIAL: CPT

## 2022-05-21 PROCEDURE — 83880 ASSAY OF NATRIURETIC PEPTIDE: CPT

## 2022-05-21 PROCEDURE — 83874 ASSAY OF MYOGLOBIN: CPT

## 2022-05-21 PROCEDURE — 87040 BLOOD CULTURE FOR BACTERIA: CPT

## 2022-05-21 PROCEDURE — 83735 ASSAY OF MAGNESIUM: CPT

## 2022-05-21 PROCEDURE — 84443 ASSAY THYROID STIM HORMONE: CPT

## 2022-05-21 PROCEDURE — 83690 ASSAY OF LIPASE: CPT

## 2022-05-21 PROCEDURE — 85025 COMPLETE CBC W/AUTO DIFF WBC: CPT

## 2022-05-21 PROCEDURE — 94760 N-INVAS EAR/PLS OXIMETRY 1: CPT

## 2022-05-21 PROCEDURE — 85610 PROTHROMBIN TIME: CPT

## 2022-05-21 PROCEDURE — 93005 ELECTROCARDIOGRAM TRACING: CPT

## 2022-05-21 PROCEDURE — 72125 CT NECK SPINE W/O DYE: CPT

## 2022-05-21 PROCEDURE — 81000 URINALYSIS NONAUTO W/SCOPE: CPT

## 2022-05-21 PROCEDURE — 85027 COMPLETE CBC AUTOMATED: CPT

## 2022-05-21 PROCEDURE — 87088 URINE BACTERIA CULTURE: CPT

## 2022-05-21 PROCEDURE — 82947 ASSAY GLUCOSE BLOOD QUANT: CPT

## 2022-05-21 PROCEDURE — 84484 ASSAY OF TROPONIN QUANT: CPT

## 2022-05-21 PROCEDURE — 71250 CT THORAX DX C-: CPT

## 2022-05-21 PROCEDURE — 85007 BL SMEAR W/DIFF WBC COUNT: CPT

## 2022-05-21 PROCEDURE — 70450 CT HEAD/BRAIN W/O DYE: CPT

## 2022-05-21 PROCEDURE — 83605 ASSAY OF LACTIC ACID: CPT

## 2022-05-21 PROCEDURE — 70486 CT MAXILLOFACIAL W/O DYE: CPT

## 2022-05-21 PROCEDURE — 80053 COMPREHEN METABOLIC PANEL: CPT

## 2022-05-21 PROCEDURE — 36415 COLL VENOUS BLD VENIPUNCTURE: CPT

## 2022-05-21 PROCEDURE — 87636 SARSCOV2 & INF A&B AMP PRB: CPT

## 2022-05-21 RX ADMIN — SODIUM CHLORIDE ONE MLS/HR: 900 INJECTION, SOLUTION INTRAVENOUS at 18:42

## 2022-05-21 RX ADMIN — SODIUM CHLORIDE ONE MLS/HR: 900 INJECTION, SOLUTION INTRAVENOUS at 13:34

## 2022-05-21 RX ADMIN — INSULIN ASPART SCH UNIT: 100 INJECTION, SOLUTION INTRAVENOUS; SUBCUTANEOUS at 20:58

## 2022-05-21 RX ADMIN — IPRATROPIUM BROMIDE AND ALBUTEROL SULFATE SCH ML: .5; 3 SOLUTION RESPIRATORY (INHALATION) at 20:30

## 2022-05-21 RX ADMIN — SODIUM CHLORIDE SCH MLS/HR: 900 INJECTION, SOLUTION INTRAVENOUS at 20:01

## 2022-05-21 RX ADMIN — SODIUM CHLORIDE SCH MLS/HR: 900 INJECTION, SOLUTION INTRAVENOUS at 13:35

## 2022-05-21 RX ADMIN — SODIUM CHLORIDE SCH MLS/HR: 900 INJECTION, SOLUTION INTRAVENOUS at 13:37

## 2022-05-21 NOTE — DIAGNOSTIC IMAGING REPORT
EXAMINATION: Chest radiograph, portable AP view.



DATE: 5/21/2022 8:58 AM



INDICATION: 86-year-old female, dizziness and weakness.



COMPARISON:  September 25, 2015.



FINDINGS: Heart size and mediastinal contours are unchanged.

There is no identified pneumothorax. There is eventration of both

hemidiaphragms. There is patchy multifocal airspace consolidation

in the right mid to upper lung which appears to be an interval

change. Some of the areas of opacification appear fairly nodular.



IMPRESSION: 

1. Nonspecific patchy multifocal airspace consolidation in the

right mid to upper lung which appear somewhat nodular in

appearance. This could relate to pneumonia or other alveolar

consolidative process. Particularly if there is little clinical

concern for pneumonia, further evaluation with CT chest is

recommended, especially given some of the nodular appearance of

the consolidation.



Dictated by: 



  Dictated on workstation # [id number]

## 2022-05-21 NOTE — ED GENERAL
General


Chief Complaint:  Dizziness/Syncope


Stated Complaint:  WEAKNESS/DIZZINESS


Source of Information:  Patient, Spouse


 (PIA SMITH MD)





History of Present Illness


Date Seen by Provider:  May 21, 2022


Time Seen by Provider:  08:14


Initial Comments


86-year-old female presenting with complaints of dizziness and weakness.  She 

states that for over 2 weeks she has been getting more dizzy and weak especially

with standing or changing positions.  She has some nausea when she gets dizzy as

well.  She has had some dry heaves and retching but no actual vomiting.  She was

having diarrhea up until 3 days ago after she took a single dose of Imodium 

which helped cause her stools to be more formed.  She denies any pain or burning

with urination but has had decreased urine output.  She has had decreased 

appetite and oral intake in the last few weeks.  Her primary care provider, Dr. Donovan, has been out of the office so she has not seen him and her appointments 

have all been canceled and/or rescheduled several weeks out.  Also the other day

she was trying to put a box up on a shelf and it fell hitting her on the face.  

She denies having a loss of consciousness or change in her vision.  She does 

occasionally have some pain behind her ears.  She denies fever or chills.  She 

does have some cough in the last few days but it is nonproductive.  Just sitting

on the bed and she does not feel bad and is not having dizziness.  However, 

sitting up on the bed and moving makes her have dizziness.  This gets worse when

she tries to stand and walk.


Timing/Duration:  Getting Worse (for over 2 weeks)


Severity:  Severe


Modifying Factors:  worse with Movement (walking and activity  makes it worse); 

improves with Rest


Associated Systoms:  Chest Pain (tightness in chest and shortness of breath with

walking and exertion), Cough (in last few days, it is non-productive); No 

Diaphoresis, No Fever/Chills, No Headaches; Loss of Appetite, Malaise, 

Nausea/Vomiting (wretching and dry heaves with nausea but no actual emesis); No 

Rash, No Seizure; Shortness of Air (with exertion she gets short of breath and 

has tightness in chest along with dizziness); No Syncope; Weakness (generalized)

(PIA SMITH MD)


Initial Comments


Agree with H & P


 (HU HOROWITZ MD)





Allergies and Home Medications


Allergies


Coded Allergies:  


     atorvastatin (Verified  Allergy, Unknown, 10/1/15)


     ibuprofen (Verified  Allergy, Unknown, 10/1/15)


     loratadine (Verified  Allergy, Unknown, 10/1/15)


     morphine (Verified  Allergy, Unknown, 10/1/15)


     ofloxacin (Verified  Allergy, Unknown, 10/1/15)





Patient Home Medication List


Home Medication List Reviewed:  Yes


 (PIA SMITH MD)


Gabapentin (Gabapentin) 300 Mg/6 Ml (6 Ml) Solution, 300 MG PO DAILY, (Reported)


   Entered as Reported by: J LUIS IVERSON on 22


   Last Action: New Order


Gabapentin (Neurontin) 300 Mg Capsule, 300 MG PO TID, (Reported)


   Entered as Reported by: J LUIS IVERSON on 22


   Last Action: New Order


Levothyroxine Sodium (Levothyroxine) 125 Mcg Capsule, 125 MCG PO DAILY, 

(Reported)


   Entered as Reported by: J LUIS IVERSON on 22


   Last Action: New Order


Lisinopril (Lisinopril) 10 Mg Tablet, 5 MG PO DAILY, (Reported)


   Entered as Reported by: LEYDI OSUNA on 7/17/15 1040


   Last Action: Reviewed


Metformin HCl (Metformin HCl) 1,000 Mg Tablet, 2,000 MG PO DAILY, (Reported)


   Entered as Reported by: J LUIS IVERSON on 22


   Last Action: New Order


Pantoprazole Sodium (Pantoprazole Sodium) 40 Mg Tablet.dr, 40 MG PO DAILY, 

(Reported)


   Entered as Reported by: J LUIS IVERSON on 22


   Last Action: New Order


Sucralfate (Sucralfate) 1 Gram Tablet, 3 GM PO TID, (Reported)


   Entered as Reported by: J LUIS IVERSON on 22


   Last Action: New Order


[rybelsus]  , (Reported)


   Entered as Reported by: J LUIS IVERSON on 22


   Last Action: New Order


Discontinued Medications


Gabapentin (Gabapentin) 300 Mg Capsule, 300 MG PO TID, (Reported)


   Discontinued Reason: No Longer Taking


   Entered as Reported by: DEVIKA DUTTA on 7/13/15 1027


   Last Action: Discontinued


Glipizide (Glipizide) 10 Mg Tablet, 10 MG PO HS, (Reported)


   Discontinued Reason: No Longer Taking


   Entered as Reported by: DEVIKA DUTTA on 7/13/15 1027


   Last Action: Discontinued


Levothyroxine Sodium (Levothyroxine 100 Mcg Tab) 100 Mcg Tablet, 100 MCG PO 

DAILY, (Reported)


   Discontinued Reason: No Longer Taking


   Entered as Reported by: DEVIKA DUTTA on 7/13/15 102


   Last Action: Discontinued


Metformin Hcl (Metformin 1000 Mg) 1,000 Mg Tablet, 1,000 MG PO HS, (Reported)


   Discontinued Reason: No Longer Taking


   Entered as Reported by: DEVIKA DUTTA on 7/13/15 102


   Last Action: Discontinued


Penicillin V Potassium (Penicillin V Potassium) 500 Mg Tablet, 500 MG PO TID


   Discontinued Reason: No Longer Taking


   Prescribed by: MINGO RUIZ on 3/6/20 1847


   Last Action: Discontinued


Sitagliptin Phosphate (Januvia) 100 Mg Tablet, 100 MG PO HS, (Reported)


   Discontinued Reason: No Longer Taking


   Entered as Reported by: DEVIKA DUTTA on 7/13/15 1027


   Last Action: Discontinued


Tramadol HCl (Tramadol HCl) 50 Mg Tablet, 50 MG PO Q12H PRN for PAIN-MILD TO 

MODERATE


   Discontinued Reason: No Longer Taking


   Prescribed by: CHRISTOS LAWLER on 17 1109


   Last Action: Discontinued


Tramadol HCl (Ultram) 50 Mg Tablet, 50 MG PO Q6H


   Discontinued Reason: No Longer Taking


   Prescribed by: MINGO RUIZ on 3/6/20 1847


   Last Action: Discontinued





Review of Systems


Review of Systems


Constitutional:  see HPI


EENTM:  ear pain (Occasional pain behind her ears), other (Bruising to the left 

periorbital area from a box hitting her face); No ear discharge, No eye pain, No

 vision loss, No mouth pain, No epistaxis, No nose congestion, No throat pain


Respiratory:  see HPI


Cardiovascular:  see HPI


Gastrointestinal:  see HPI


Genitourinary:  decreased output


Musculoskeletal:  no symptoms reported


Skin:  No rash


Psychiatric/Neurological:  See HPI, Weakness (Generalized) (PIA SMITH MD)





Past Medical-Social-Family Hx


Patient Social History


Tobacco Use?:  No


Substance use?:  No


 (PIA SMITH MD)





Seasonal Allergies


Seasonal Allergies:  Yes


 (PIA SMITH MD)





Past Medical History


Surgery/Hospitalization HX:  


Hypertension, hypothyroidism, hysterectomy, cholecystectomy, Hard of


hearing, Breast cancer history with right mastectomy, Osteoarthritis,


Recurrent UTIs


Surgeries:  Yes (RIGHT BREAST LUMPECTOMY, BACK SX, RIGHT BREAST MASTECTOMY)


Gallbladder, Hysterectomy


Respiratory:  No


Cardiac:  Yes


Heart Murmur, Hypertension


Neurological:  No


Reproductive Disorders:  No


Genitourinary:  Yes


UTI-Chronic


Gastrointestinal:  No


Musculoskeletal:  Yes


Arthritis, Chronic Back Pain


Endocrine:  Yes


Hypothyroidsim, Diabetes, Non-Insulin dep


Hearing Impairment:  Hard of Hearing


Cancer:  Yes


Breast


Psychosocial:  No


Integumentary:  No


Blood Disorders:  No


 (PIA SMITH MD)





Physical Exam


Vital Signs





Vital Signs - First Documented








 22





 08:15


 


Temp 36.5


 


Pulse 107


 


Resp 23


 


B/P (MAP) 143/56 (85)


 


Pulse Ox 97


 


O2 Delivery Room Air








 (HU HOROWITZ MD)


Vital Signs


Capillary Refill :  


 (PIA SMITH MD)


Height, Weight, BMI


Height: 5'0.00"


Weight: 144lbs. 0.0oz. [id number]; 27.00 BMI


Method:


General Appearance:  Anxious


HEENT:  PERRL/EOMI; No TMs Normal (cerumen present in bilateral canals without 

impaction. Unable to visualize TM due to cerumen), No Moist Mucous Membranes 

(slightly dry mucous membranes); Other (Negative carney sign, negative raccoon 

sign, no CSF otorrhea, no CSF rhinorrhea.  There is mild bruising and an 

abrasion to the left side of the nose and infraorbital area)


Neck:  Full Range of Motion, Normal Inspection, Non Tender


Respiratory:  Chest Non Tender, Lungs Clear, No Accessory Muscle Use, No 

Respiratory Distress, Decreased Breath Sounds


Cardiovascular:  Normal Peripheral Pulses, Tachycardia


Gastrointestinal:  Normal Bowel Sounds, No Pulsatile Mass, Non Tender, Soft; No 

Distended, No Guarding, No Rebound


Rectal:  Deferred


Extremity:  Normal Capillary Refill, No Calf Tenderness


Neurologic/Psychiatric:  Alert, Oriented x3, CNs II-XII Norm as Tested


Skin:  Warm/Dry (PIA SMITH MD)





Focused Exam


Lactate Level


22 09:00: Lactic Acid Level 1.72


 (HU HOROWITZ MD)


Lactic Acid Level





Laboratory Tests








Test


 22


09:00


 


Lactic Acid Level


 1.72 MMOL/L


(0.50-2.00)





 (HU HOROWITZ MD)





Progress/Results/Core Measures


Suspected Sepsis


SIRS


Temperature: 


Pulse:  


Respiratory Rate: 


 


Laboratory Tests


22 08:25: White Blood Count 12.4H


Blood Pressure  / 


Mean: 


 


22 09:00: Lactic Acid Level 1.72


Laboratory Tests


22 08:25: 


INR Comment 1.0, Platelet Count 423H, Total Bilirubin 0.9


 (PIA SMITH MD)





Results/Orders


Lab Results





Laboratory Tests








Test


 22


08:20 22


08:25 22


09:00 Range/Units


 


 


Urine Color YELLOW     


 


Urine Clarity CLEAR     


 


Urine pH 6.0    5-9  


 


Urine Specific Gravity 1.025 H   1.016-1.022  


 


Urine Protein 2+ H   NEGATIVE  


 


Urine Glucose (UA) TRACE H   NEGATIVE  


 


Urine Ketones 1+ H   NEGATIVE  


 


Urine Nitrite NEGATIVE    NEGATIVE  


 


Urine Bilirubin 1+ H   NEGATIVE  


 


Urine Urobilinogen 1.0    < = 1.0  MG/DL


 


Urine Leukocyte Esterase TRACE H   NEGATIVE  


 


Urine RBC (Auto) TRACE-I H   NEGATIVE  


 


Urine RBC 2-5 H    /HPF


 


Urine WBC 5-10 H    /HPF


 


Urine Squamous Epithelial


Cells 5-10 


 


 


  /HPF





 


Urine Crystals NONE     /LPF


 


Urine Bacteria MODERATE H    /HPF


 


Urine Casts PRESENT     /LPF


 


Urine Granular Casts 0-2 H    /LPF


 


Urine Mucus SMALL H    /LPF


 


Urine Culture Indicated YES     


 


White Blood Count


 


 12.4 H


 


 4.3-11.0


10^3/uL


 


Red Blood Count


 


 4.46 


 


 3.80-5.11


10^6/uL


 


Hemoglobin  12.6   11.5-16.0  g/dL


 


Hematocrit  37   35-52  %


 


Mean Corpuscular Volume  83   80-99  fL


 


Mean Corpuscular Hemoglobin  28   25-34  pg


 


Mean Corpuscular Hemoglobin


Concent 


 34 


 


 32-36  g/dL





 


Red Cell Distribution Width  12.7   10.0-14.5  %


 


Platelet Count


 


 423 H


 


 130-400


10^3/uL


 


Mean Platelet Volume  9.3   9.0-12.2  fL


 


Immature Granulocyte % (Auto)  1    %


 


Neutrophils (%) (Auto)  88 H  42-75  %


 


Lymphocytes (%) (Auto)  6 L  12-44  %


 


Monocytes (%) (Auto)  5   0-12  %


 


Eosinophils (%) (Auto)  0   0-10  %


 


Basophils (%) (Auto)  0   0-10  %


 


Neutrophils # (Auto)


 


 10.9 H


 


 1.8-7.8


10^3/uL


 


Lymphocytes # (Auto)


 


 0.7 L


 


 1.0-4.0


10^3/uL


 


Monocytes # (Auto)


 


 0.7 


 


 0.0-1.0


10^3/uL


 


Eosinophils # (Auto)


 


 0.0 


 


 0.0-0.3


10^3/uL


 


Basophils # (Auto)


 


 0.0 


 


 0.0-0.1


10^3/uL


 


Immature Granulocyte # (Auto)


 


 0.1 


 


 0.0-0.1


10^3/uL


 


Neutrophils % (Manual)  92    %


 


Lymphocytes % (Manual)  7    %


 


Monocytes % (Manual)  1    %


 


Prothrombin Time  13.2   12.2-14.7  SEC


 


INR Comment  1.0   0.8-1.4  


 


Activated Partial


Thromboplast Time 


 33 


 


 24-35  SEC





 


Sodium Level  128 L  135-145  MMOL/L


 


Potassium Level  4.1   3.6-5.0  MMOL/L


 


Chloride Level  92 L    MMOL/L


 


Carbon Dioxide Level  20 L  21-32  MMOL/L


 


Anion Gap  16 H  5-14  MMOL/L


 


Blood Urea Nitrogen  14   7-18  MG/DL


 


Creatinine


 


 1.01 


 


 0.60-1.30


MG/DL


 


Estimat Glomerular Filtration


Rate 


 54 


 


  





 


BUN/Creatinine Ratio  14    


 


Glucose Level  219 H    MG/DL


 


Calcium Level  9.4   8.5-10.1  MG/DL


 


Corrected Calcium  9.6   8.5-10.1  MG/DL


 


Magnesium Level  1.4 L  1.6-2.4  MG/DL


 


Total Bilirubin  0.9   0.1-1.0  MG/DL


 


Aspartate Amino Transf


(AST/SGOT) 


 14 


 


 5-34  U/L





 


Alanine Aminotransferase


(ALT/SGPT) 


 10 


 


 0-55  U/L





 


Alkaline Phosphatase  99     U/L


 


Myoglobin


 


 56.9 


 


 10.0-92.0


NG/ML


 


Troponin I  < 0.30   <0.30  NG/ML


 


Pro-B-Type Natriuretic Peptide  893.4 H  <75.0  PG/ML


 


Total Protein  8.0   6.4-8.2  GM/DL


 


Albumin  3.8   3.2-4.5  GM/DL


 


Lipase  25   8-78  U/L


 


Lactic Acid Level


 


 


 1.72 


 0.50-2.00


MMOL/L





 (HU HOROWITZ MD)


My Orders





Orders - HU HOROWITZ MD


Magnesium 1 Gm/100 Ml Ivpb (Magnesium Jernigan (22 09:44)


Azithromycin Injection (Zithromax Inject (22 09:45)


Covid 19 Inhouse Test (22 09:53)


Influenza A And B Antigens (22 09:53)


Influenza A And B By Pcr (22 09:53)


 (HU HOROWITZ MD)


Vital Signs/I&O











 22





 08:15


 


Temp 36.5


 


Pulse 107


 


Resp 23


 


B/P (MAP) 143/56 (85)


 


Pulse Ox 97


 


O2 Delivery Room Air








 (HU HOROWITZ MD)


Vital Signs/I&O


Capillary Refill :  


 (PIA SMITH MD)


Progress Note #1:  


Progress Note


Obtain urinalysis, basic labs, cardiac enzymes, electrocardiogram, chest x-ray 

since she had a cough and shortness of breath with exertion, CT of her head, 

face, cervical spine.  Since she had dizziness with standing and changing 

position as well as a box hitting her in the face will look for signs of 

intracranial hemorrhage or stroke as well as facial fractures or sinusitis.  

Evaluate her cervical spine for acute fracture that might be contributing to her

 dizziness with standing and moving as well.  The electrocardiogram would look 

for arrhythmias or acute ischemia.  Cardiac telemetry monitoring to evaluate for

 possible ectopy or cardiac arrhythmia.  Urinalysis to look for UTI or 

dehydration.  EKG and cardiac enzymes to look for signs of myocardial infarction

 or heart failure as a source of her dizziness and shortness of breath with 

exertion.  Give normal saline 1 L IV fluid for hydration to help with her 

slightly elevated heart rate and dizziness with changing position.


Progress Note #2:  


   Time:  08:51


Progress Note


Electrocardiogram shows sinus rhythm without ST elevation.  She does have 

findings for UTI and has an elevated white blood cell count so blood cultures 

and lactic acid were added on.  Chemistry shows some hyponatremia with a sodium 

of 128.  From a year ago this is down from 137.  Her renal function looked okay.

  Her magnesium was low at 1.4.  Her cardiac enzymes were still pending.  Her 

imaging was still pending as well.  Will pass care to Dr. Horowitz at 0900 to 

follow up on these pending results and determine disposition.


 (PIA SMITH MD)


Progress Note :  


Progress Note


SIGN OUT FROM NIGHT PHYSICIAN


1. GENERALIZED WEAKNESS DUE TO UTI with HEMATURIA & RIGHT SIDED PNEUMONIA:


- CXR: right upper and middle lobe pneumonia


- UA is positive for LE+, WBC, RBC, and bacteria


- WBC is 12.5 with a left shift


- Lactic acid normal


- Ceftriaxone 1gm iv & Azithro 500mg iv STAT


- Will admit to med surg, accepted by Dr Goyal


2. HYPONATREMIA & HYPOMAGNESEMIA:


- s. Na is 128 and s. Mg is 1.4


- NS IVF bolus 1L


- Magnesium 2gm iv STAT


 (HU HOROWITZ MD)


ECG


Initial ECG Impression Date:  May 21, 2022


Initial ECG Impression Time:  08:36


Initial ECG Rate:  99


Initial ECG Rhythm:  Normal Sinus


Initial ECG Comparisson:  Unchanged


Comment


Normal sinus rhythm with a heart rate of 99 bpm.  LA interval 142 ms.  No acute 

ST elevation.  QT interval 324 ms with a QTc interval 380 ms.  Overall appears 

similar to prior tracings in the system.  She does have some artifact on the 

tracing.


 (PIA SMITH MD)





Diagnostic Imaging





   Diagonstic Imaging:  CT


   Plain Films/CT/US/NM/MRI:  facial bones, c-spine, head


Comments


                 [facility].


                                [place], [place]





NAME:   RAQUEL BRAR


MED REC#:   [id number]


ACCOUNT#:   [id number]


PT STATUS:   REG ER


:   1935


PHYSICIAN:   PIA SMITH MD


ADMIT DATE:   22/ER FS


                                  ***Signed***


Date of Exam:22





CT HEAD/FACE/CERVICAL WO








PROCEDURE: CT head, face, and cervical spine without contrast.





TECHNIQUE: Multiple contiguous axial images were obtained through


the head, neck, and facial bones without the use of intravenous


contrast. Sagittal and coronal reformations through the cervical


spine and facial bones were also performed. Auto Exposure


Controls were utilized during the CT exam to meet ALARA standards


for radiation dose reduction. 





Date: May 21, 2022.





Indication: 86-year-old female, dizziness and weakness. Fall.


Head, face, and neck pain.





Comparison: None.





Findings:





There is no identified skull fracture. There is mild proportional


prominence of the ventricles and additional CSF spaces consistent


with mild cerebral volume loss. There is no identified abnormal


extra-axial fluid collection. There is no evidence of acute


intracranial hemorrhage. There is no mass effect or midline


shift.





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 The


temporomandibular joints are normally aligned. There is no


identified mandibular fracture. There is no identified nasal bone


fracture. There is no identified acute maxillofacial bone


fracture. The frontal sinuses are hypoplastic. There is mucosal


thickening of the maxillary sinuses.





The globes are grossly intact. There is no retro-orbital


hematoma.





There is no identified facet joint subluxation or dislocation.


There are multilevel facet degenerative changes of the cervical


spine. There is no asymmetric widening of the cervical disc


spaces. There is no prominent prevertebral soft tissue swelling.


There is arthritis at the C1-C2 articulation. The cervical disc


heights are well preserved. CT is limited for assessment of disc


pathology as well as additional non-bony causes of pathology in


the spinal canal. There is no identified acute fracture of the


cervical spine.





There are bilateral carotid vascular calcifications.





Impression:


1. No identified acute intracranial abnormality.


2. No identified acute maxillofacial bone fracture.


3. Severe bilateral temporomandibular arthritis. An inflammatory


arthropathy would be in the differential diagnosis.


4. No identified acute fracture of the cervical spine.


5. Multilevel predominantly facet degenerative changes of the


cervical spine.





Dictated by: 





  Dictated on workstation # [id number]








Dict:   [id number]


Trans:   22 1018


[facility] [id number]





Interpreted by:     GWENDOLYN CENTENO MD


Electronically signed by: GWENDOLYN CENTENO MD 22 1018








   Diagonstic Imaging:  Xray


   Plain Films/CT/US/NM/MRI:  chest


Comments


                 [facility] [place], [place]





NAME:   BRARRAQUEL


MED REC#:   [id number]


ACCOUNT#:   [id number]


PT STATUS:   REG ER


:   1935


PHYSICIAN:   PIA SMITH MD


ADMIT DATE:   22/ER FS


                                  ***Signed***


Date of Exam:22





CHEST 1 VIEW AP/PA ONLY








EXAMINATION: Chest radiograph, portable AP view.





DATE: 2022 8:58 AM





INDICATION: 86-year-old female, dizziness and weakness.





COMPARISON:  2015.





FINDINGS: Heart size and mediastinal contours are unchanged.


There is no identified pneumothorax. There is eventration of both


hemidiaphragms. There is patchy multifocal airspace consolidation


in the right mid to upper lung which appears to be an interval


change. Some of the areas of opacification appear fairly nodular.





IMPRESSION: 


1. Nonspecific patchy multifocal airspace consolidation in the


right mid to upper lung which appear somewhat nodular in


appearance. This could relate to pneumonia or other alveolar


consolidative process. Particularly if there is little clinical


concern for pneumonia, further evaluation with CT chest is


recommended, especially given some of the nodular appearance of


the consolidation.





Dictated by: 





  Dictated on workstation # [id number]








Dict:   [id number]


Trans:   2221


[facility] [id number]





Interpreted by:     GWENDOLYN CENTENO MD


Electronically signed by: GWENDOLYN CENTENO MD 22


 (PIA SMITH MD)





Departure


Communication (Admissions)


Time/Spoke to Admitting Phy:  10:00


see below


 (HU HOROWITZ MD)





Impression





   Primary Impression:  


   Cystitis without hematuria


   Additional Impressions:  


   Hyponatremia


   Generalized weakness


   Hypomagnesemia


   Pneumonia involving right lung


   Qualified Codes:  J18.9 - Pneumonia, unspecified organism


Disposition:  30 STILL A PATIENT


Condition:  Stable





Admissions


Decision to Admit Reason:  Admit from ER (General)


Decision to Admit/Date:  May 21, 2022


Time/Decision to Admit Time:  09:50


 (HU HOROWITZ MD)





Transfer


Transfer Reason:  Exceeds level of care


Time Spoke to Accepting Phy:  10:00


Transfer Progress Notes


Discussed with Dr Goyal and accepted for admission


Transfer Facility:  


[facility]


Method of Transfer:  EMS


 (HU HOROWITZ MD)





Departure-Patient Inst.


Referrals:  


KELLY DONOVAN MD (PCP/Family)


Primary Care Physician











PIA SMITH MD               May 21, 2022 08:37


HU HOROWITZ MD              May 21, 2022 10:04

## 2022-05-21 NOTE — DIAGNOSTIC IMAGING REPORT
PROCEDURE: CT head, face, and cervical spine without contrast.



TECHNIQUE: Multiple contiguous axial images were obtained through

the head, neck, and facial bones without the use of intravenous

contrast. Sagittal and coronal reformations through the cervical

spine and facial bones were also performed. Auto Exposure

Controls were utilized during the CT exam to meet ALARA standards

for radiation dose reduction. 



Date: May 21, 2022.



Indication: 86-year-old female, dizziness and weakness. Fall.

Head, face, and neck pain.



Comparison: None.



Findings:



There is no identified skull fracture. There is mild proportional

prominence of the ventricles and additional CSF spaces consistent

with mild cerebral volume loss. There is no identified abnormal

extra-axial fluid collection. There is no evidence of acute

intracranial hemorrhage. There is no mass effect or midline

shift.



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 The

temporomandibular joints are normally aligned. There is no

identified mandibular fracture. There is no identified nasal bone

fracture. There is no identified acute maxillofacial bone

fracture. The frontal sinuses are hypoplastic. There is mucosal

thickening of the maxillary sinuses.



The globes are grossly intact. There is no retro-orbital

hematoma.



There is no identified facet joint subluxation or dislocation.

There are multilevel facet degenerative changes of the cervical

spine. There is no asymmetric widening of the cervical disc

spaces. There is no prominent prevertebral soft tissue swelling.

There is arthritis at the C1-C2 articulation. The cervical disc

heights are well preserved. CT is limited for assessment of disc

pathology as well as additional non-bony causes of pathology in

the spinal canal. There is no identified acute fracture of the

cervical spine.



There are bilateral carotid vascular calcifications.



Impression:

1. No identified acute intracranial abnormality.

2. No identified acute maxillofacial bone fracture.

3. Severe bilateral temporomandibular arthritis. An inflammatory

arthropathy would be in the differential diagnosis.

4. No identified acute fracture of the cervical spine.

5. Multilevel predominantly facet degenerative changes of the

cervical spine.



Dictated by: 



  Dictated on workstation # [id number]

## 2022-05-22 VITALS — SYSTOLIC BLOOD PRESSURE: 131 MMHG | DIASTOLIC BLOOD PRESSURE: 64 MMHG

## 2022-05-22 VITALS — SYSTOLIC BLOOD PRESSURE: 163 MMHG | DIASTOLIC BLOOD PRESSURE: 70 MMHG

## 2022-05-22 VITALS — DIASTOLIC BLOOD PRESSURE: 69 MMHG | SYSTOLIC BLOOD PRESSURE: 150 MMHG

## 2022-05-22 VITALS — DIASTOLIC BLOOD PRESSURE: 68 MMHG | SYSTOLIC BLOOD PRESSURE: 139 MMHG

## 2022-05-22 VITALS — SYSTOLIC BLOOD PRESSURE: 154 MMHG | DIASTOLIC BLOOD PRESSURE: 77 MMHG

## 2022-05-22 VITALS — DIASTOLIC BLOOD PRESSURE: 75 MMHG | SYSTOLIC BLOOD PRESSURE: 155 MMHG

## 2022-05-22 VITALS — DIASTOLIC BLOOD PRESSURE: 63 MMHG | SYSTOLIC BLOOD PRESSURE: 138 MMHG

## 2022-05-22 LAB
BASOPHILS # BLD AUTO: 0 10^3/UL (ref 0–0.1)
BASOPHILS NFR BLD AUTO: 0 % (ref 0–10)
BUN/CREAT SERPL: 12
BUN/CREAT SERPL: 14
CALCIUM SERPL-MCNC: 8.1 MG/DL (ref 8.5–10.1)
CALCIUM SERPL-MCNC: 8.2 MG/DL (ref 8.5–10.1)
CHLORIDE SERPL-SCNC: 101 MMOL/L (ref 98–107)
CHLORIDE SERPL-SCNC: 104 MMOL/L (ref 98–107)
CO2 SERPL-SCNC: 18 MMOL/L (ref 21–32)
CO2 SERPL-SCNC: 18 MMOL/L (ref 21–32)
CREAT SERPL-MCNC: 0.75 MG/DL (ref 0.6–1.3)
CREAT SERPL-MCNC: 0.79 MG/DL (ref 0.6–1.3)
EOSINOPHIL # BLD AUTO: 0 10^3/UL (ref 0–0.3)
EOSINOPHIL NFR BLD AUTO: 0 % (ref 0–10)
GFR SERPLBLD BASED ON 1.73 SQ M-ARVRAT: 73 ML/MIN
GFR SERPLBLD BASED ON 1.73 SQ M-ARVRAT: 77 ML/MIN
GLUCOSE SERPL-MCNC: 153 MG/DL (ref 70–105)
GLUCOSE SERPL-MCNC: 159 MG/DL (ref 70–105)
HCT VFR BLD CALC: 30 % (ref 35–52)
HGB BLD-MCNC: 10 G/DL (ref 11.5–16)
LYMPHOCYTES # BLD AUTO: 0.7 10^3/UL (ref 1–4)
LYMPHOCYTES NFR BLD AUTO: 9 % (ref 12–44)
MANUAL DIFFERENTIAL PERFORMED BLD QL: NO
MCH RBC QN AUTO: 29 PG (ref 25–34)
MCHC RBC AUTO-ENTMCNC: 33 G/DL (ref 32–36)
MCV RBC AUTO: 85 FL (ref 80–99)
MONOCYTES # BLD AUTO: 0.6 10^3/UL (ref 0–1)
MONOCYTES NFR BLD AUTO: 8 % (ref 0–12)
NEUTROPHILS # BLD AUTO: 6.8 10^3/UL (ref 1.8–7.8)
NEUTROPHILS NFR BLD AUTO: 82 % (ref 42–75)
PLATELET # BLD: 360 10^3/UL (ref 130–400)
PMV BLD AUTO: 9.8 FL (ref 9–12.2)
POTASSIUM SERPL-SCNC: 3.4 MMOL/L (ref 3.6–5)
POTASSIUM SERPL-SCNC: 3.4 MMOL/L (ref 3.6–5)
SODIUM SERPL-SCNC: 133 MMOL/L (ref 135–145)
SODIUM SERPL-SCNC: 134 MMOL/L (ref 135–145)
WBC # BLD AUTO: 8.2 10^3/UL (ref 4.3–11)

## 2022-05-22 RX ADMIN — IPRATROPIUM BROMIDE AND ALBUTEROL SULFATE SCH ML: .5; 3 SOLUTION RESPIRATORY (INHALATION) at 20:31

## 2022-05-22 RX ADMIN — CARBOXYMETHYLCELLULOSE SODIUM SCH EACH: 5 SOLUTION/ DROPS OPHTHALMIC at 09:21

## 2022-05-22 RX ADMIN — IPRATROPIUM BROMIDE AND ALBUTEROL SULFATE SCH ML: .5; 3 SOLUTION RESPIRATORY (INHALATION) at 07:30

## 2022-05-22 RX ADMIN — CARBOXYMETHYLCELLULOSE SODIUM SCH EACH: 5 SOLUTION/ DROPS OPHTHALMIC at 17:12

## 2022-05-22 RX ADMIN — INSULIN ASPART SCH UNIT: 100 INJECTION, SOLUTION INTRAVENOUS; SUBCUTANEOUS at 06:14

## 2022-05-22 RX ADMIN — CARBOXYMETHYLCELLULOSE SODIUM SCH EACH: 5 SOLUTION/ DROPS OPHTHALMIC at 20:05

## 2022-05-22 RX ADMIN — CARBOXYMETHYLCELLULOSE SODIUM SCH EACH: 5 SOLUTION/ DROPS OPHTHALMIC at 12:11

## 2022-05-22 RX ADMIN — SODIUM CHLORIDE SCH MLS/HR: 900 INJECTION, SOLUTION INTRAVENOUS at 17:12

## 2022-05-22 RX ADMIN — SODIUM CHLORIDE SCH MLS/HR: 900 INJECTION, SOLUTION INTRAVENOUS at 09:20

## 2022-05-22 RX ADMIN — SODIUM CHLORIDE SCH MLS/HR: 900 INJECTION, SOLUTION INTRAVENOUS at 02:55

## 2022-05-22 RX ADMIN — CEFTRIAXONE SCH MLS/HR: 1 INJECTION, SOLUTION INTRAVENOUS at 09:17

## 2022-05-22 RX ADMIN — INSULIN ASPART SCH UNIT: 100 INJECTION, SOLUTION INTRAVENOUS; SUBCUTANEOUS at 11:26

## 2022-05-22 RX ADMIN — INSULIN ASPART SCH UNIT: 100 INJECTION, SOLUTION INTRAVENOUS; SUBCUTANEOUS at 20:06

## 2022-05-22 RX ADMIN — INSULIN ASPART SCH UNIT: 100 INJECTION, SOLUTION INTRAVENOUS; SUBCUTANEOUS at 17:09

## 2022-05-22 RX ADMIN — SODIUM CHLORIDE SCH MLS/HR: 900 INJECTION, SOLUTION INTRAVENOUS at 09:21

## 2022-05-22 NOTE — HISTORY & PHYSICAL-HOSPITALIST
History of Present Illness


HPI/Chief Complaint


86-year-old female presenting with complaints of dizziness and weakness.  She 

states that for over 2 weeks she has been getting more dizzy and weak especially

with standing or changing positions.  She has some nausea when she gets dizzy as

well.  She has had some dry heaves and retching but no actual vomiting.  She was

having diarrhea up until 3 days ago after she took a single dose of Imodium 

which helped cause her stools to be more formed.  She denies any pain or burning

with urination but has had decreased urine output.  She has had decreased 

appetite and oral intake in the last few weeks.  Her primary care provider, Dr. Ac, has been out of the office so she has not seen him and her appointments 

have all been canceled and/or rescheduled several weeks out.  Also the other day

she was trying to put a box up on a shelf and it fell hitting her on the face.  

She denies having a loss of consciousness or change in her vision.  She does 

occasionally have some pain behind her ears.  She denies fever or chills.  She 

does have some cough in the last few days but it is nonproductive.  Just sitting

on the bed and she does not feel bad and is not having dizziness.  However, 

sitting up on the bed and moving makes her have dizziness.  This gets worse when

she tries to stand and walk.


Upon my arrival the patient was alert oriented and finished breakfast.  She 

reported some mild nausea for which she was given Zofran with resolution.She 

reports that she had not been feeling well for a week with cough felt like she 

was burning up at times but denied rigors and reports no significant sputum 

production also denying hemoptysis.  She has not had previous problems with 

pneumonia.  Her  was with her and had not been ill himself.  She still 

feels extremely weak but a little bit better denying shortness of breath.  She 

has some mild chest discomfort with cough only and denies pleuritic pain.


Date Seen


5/22/22


Time Seen by a Provider:  09:00


Attending Physician


Tino Ac MD


PCP


Admitting Physician:


Go Michaud MD 








Attending Physician:


Go Michaud MD


Referring Physician





Date of Admission


May 21, 2022 at 12:44





Home Medications & Allergies


Home Medications


Reviewed patient Home Medication Reconciliation performed by pharmacy medication

reconciliations technician and/or nursing.


Patients Allergies have been reviewed.





Allergies





Allergies


Coded Allergies


  atorvastatin (Verified Allergy, Unknown, 10/1/15)


  ibuprofen (Verified Allergy, Unknown, 10/1/15)


  loratadine (Verified Allergy, Unknown, 10/1/15)


  morphine (Verified Allergy, Unknown, 10/1/15)


  ofloxacin (Verified Allergy, Unknown, 10/1/15)








Past Medical-Social-Family Hx


Patient Social History


Tobacco Use?:  No


Substance use?:  No


Alcohol Use?:  No


Pt feels they are or have been:  No





Immunizations Up To Date


First/Initial COVID19 Vaccinat:  2/2021


Second COVID19 Vaccination John:  3/9/2021


Date of Pneumonia Vaccine:  Jul 13, 2010





Seasonal Allergies


Seasonal Allergies:  Yes





Current Status


Advance Directives:  No


Communicates:  Verbally


Primary Language:  English


Preferred Spoken Language:  English


Sensory deficits:  Vision impairment, Hearing impairment


Implanted or Applied Medical D:  None





Past Medical History


Surgeries:  Gallbladder, Hysterectomy


Heart Murmur, Hypertension


UTI-Chronic


Arthritis, Chronic Back Pain


Hypothyroidsim, Diabetes, Non-Insulin dep


Hearing Impairment:  Hard of Hearing


Breast


Blood Disorders:  No





Review of Systems


Constitutional:  see HPI





Physical Exam


Physical Exam


Vital Signs





Vital Signs - First Documented








 5/21/22





 08:15


 


Temp 36.5


 


Pulse 107


 


Resp 23


 


B/P (MAP) 143/56 (85)


 


Pulse Ox 97


 


O2 Delivery Room Air





Capillary Refill : Less Than 3 Seconds


Height, Weight, BMI


Height: 5'0.00"


Weight: 144lbs. 0.0oz. 65.202007ar; 61.58 BMI


Method:


General Appearance:  No Apparent Distress


Respiratory:  Chest Non Tender, No Accessory Muscle Use, No Respiratory 

Distress, Other (Rales in the right base to the right midlung field elsewhere 

clear no wheezing noted)


Cardiovascular:  Regular Rate, Rhythm, No Edema, No JVD, No Murmur, Gallop/S4


Gastrointestinal:  Normal Bowel Sounds, No Organomegaly, No Pulsatile Mass, Non 

Tender, Soft


Extremity:  Normal Inspection, Normal Range of Motion, Non Tender, No Calf 

Tenderness, No Pedal Edema


Neurologic/Psychiatric:  Alert, Oriented x3





Results


Results/Procedures


Labs


Laboratory Tests


5/21/22 08:25








5/21/22 16:41








5/22/22 00:05








5/22/22 05:20








Patient resulted labs reviewed.





Assessment/Plan


Admission Diagnosis


1.  Probable right upper lobe pneumonia most likely as patient's white count was

elevated and this was acute to subacute in onset in regards to symptoms in an 

individual previously well with no smoking history.  Continue IV antibiotics but

patient will need follow-up chest x-ray to make sure that there is resolution  

In roughly 4 weeks if this is not the case or  there is no  clinical improvement

consider CT chest considering somewhat of an atypical nodular appearance on 

chest x-ray.


2.  Mild hyponatremia resolving secondary to #1.


3.  Weakness with fall secondary to #1.


4.  Type 2 diabetes will change diet to 1200-calorie ADA hold metformin and 

apparently patient is on Rybelsus which we will be unable to obtain monitoring 

blood sugars currently in the 150 range.


5.  Hypertension continue lisinopril.


6.  On thyroid replacement will add TSH and continue L-thyroxine at current 

reported 125 mcg dose.


Admission Status:  Inpatient Order (span 2 midnights)


Reason for Inpatient Admission:  


See admission diagnosis











GO MICHAUD MD              May 22, 2022 10:25

## 2022-05-23 VITALS — DIASTOLIC BLOOD PRESSURE: 74 MMHG | SYSTOLIC BLOOD PRESSURE: 149 MMHG

## 2022-05-23 VITALS — SYSTOLIC BLOOD PRESSURE: 166 MMHG | DIASTOLIC BLOOD PRESSURE: 79 MMHG

## 2022-05-23 VITALS — SYSTOLIC BLOOD PRESSURE: 180 MMHG | DIASTOLIC BLOOD PRESSURE: 74 MMHG

## 2022-05-23 VITALS — DIASTOLIC BLOOD PRESSURE: 72 MMHG | SYSTOLIC BLOOD PRESSURE: 163 MMHG

## 2022-05-23 LAB
BASOPHILS # BLD AUTO: 0 10^3/UL (ref 0–0.1)
BASOPHILS NFR BLD AUTO: 0 % (ref 0–10)
BUN/CREAT SERPL: 10
CALCIUM SERPL-MCNC: 8.2 MG/DL (ref 8.5–10.1)
CHLORIDE SERPL-SCNC: 105 MMOL/L (ref 98–107)
CO2 SERPL-SCNC: 16 MMOL/L (ref 21–32)
CREAT SERPL-MCNC: 0.7 MG/DL (ref 0.6–1.3)
EOSINOPHIL # BLD AUTO: 0.1 10^3/UL (ref 0–0.3)
EOSINOPHIL NFR BLD AUTO: 1 % (ref 0–10)
GFR SERPLBLD BASED ON 1.73 SQ M-ARVRAT: 84 ML/MIN
GLUCOSE SERPL-MCNC: 156 MG/DL (ref 70–105)
HCT VFR BLD CALC: 30 % (ref 35–52)
HGB BLD-MCNC: 9.9 G/DL (ref 11.5–16)
LYMPHOCYTES # BLD AUTO: 0.8 10^3/UL (ref 1–4)
LYMPHOCYTES NFR BLD AUTO: 9 % (ref 12–44)
MANUAL DIFFERENTIAL PERFORMED BLD QL: NO
MCH RBC QN AUTO: 28 PG (ref 25–34)
MCHC RBC AUTO-ENTMCNC: 33 G/DL (ref 32–36)
MCV RBC AUTO: 86 FL (ref 80–99)
MONOCYTES # BLD AUTO: 0.5 10^3/UL (ref 0–1)
MONOCYTES NFR BLD AUTO: 6 % (ref 0–12)
NEUTROPHILS # BLD AUTO: 6.9 10^3/UL (ref 1.8–7.8)
NEUTROPHILS NFR BLD AUTO: 83 % (ref 42–75)
PLATELET # BLD: 391 10^3/UL (ref 130–400)
PMV BLD AUTO: 9.3 FL (ref 9–12.2)
POTASSIUM SERPL-SCNC: 3.4 MMOL/L (ref 3.6–5)
SODIUM SERPL-SCNC: 134 MMOL/L (ref 135–145)
WBC # BLD AUTO: 8.3 10^3/UL (ref 4.3–11)

## 2022-05-23 RX ADMIN — LEVOTHYROXINE SODIUM SCH MCG: 125 TABLET ORAL at 05:58

## 2022-05-23 RX ADMIN — ACETAMINOPHEN PRN MG: 500 TABLET ORAL at 10:41

## 2022-05-23 RX ADMIN — IPRATROPIUM BROMIDE AND ALBUTEROL SULFATE SCH ML: .5; 3 SOLUTION RESPIRATORY (INHALATION) at 07:01

## 2022-05-23 RX ADMIN — SODIUM CHLORIDE SCH MLS/HR: 900 INJECTION, SOLUTION INTRAVENOUS at 23:43

## 2022-05-23 RX ADMIN — CARBOXYMETHYLCELLULOSE SODIUM SCH EACH: 5 SOLUTION/ DROPS OPHTHALMIC at 22:13

## 2022-05-23 RX ADMIN — INSULIN ASPART SCH UNIT: 100 INJECTION, SOLUTION INTRAVENOUS; SUBCUTANEOUS at 05:58

## 2022-05-23 RX ADMIN — ENOXAPARIN SODIUM SCH MG: 100 INJECTION SUBCUTANEOUS at 23:42

## 2022-05-23 RX ADMIN — CARBOXYMETHYLCELLULOSE SODIUM SCH EACH: 5 SOLUTION/ DROPS OPHTHALMIC at 13:02

## 2022-05-23 RX ADMIN — SODIUM CHLORIDE SCH MLS/HR: 900 INJECTION, SOLUTION INTRAVENOUS at 00:55

## 2022-05-23 RX ADMIN — INSULIN ASPART SCH UNIT: 100 INJECTION, SOLUTION INTRAVENOUS; SUBCUTANEOUS at 16:40

## 2022-05-23 RX ADMIN — SODIUM CHLORIDE SCH MLS/HR: 900 INJECTION, SOLUTION INTRAVENOUS at 10:20

## 2022-05-23 RX ADMIN — CARBOXYMETHYLCELLULOSE SODIUM SCH EACH: 5 SOLUTION/ DROPS OPHTHALMIC at 17:19

## 2022-05-23 RX ADMIN — IPRATROPIUM BROMIDE AND ALBUTEROL SULFATE SCH ML: .5; 3 SOLUTION RESPIRATORY (INHALATION) at 20:20

## 2022-05-23 RX ADMIN — INSULIN ASPART SCH UNIT: 100 INJECTION, SOLUTION INTRAVENOUS; SUBCUTANEOUS at 20:17

## 2022-05-23 RX ADMIN — INSULIN ASPART SCH UNIT: 100 INJECTION, SOLUTION INTRAVENOUS; SUBCUTANEOUS at 13:03

## 2022-05-23 RX ADMIN — CEFTRIAXONE SCH MLS/HR: 1 INJECTION, SOLUTION INTRAVENOUS at 08:35

## 2022-05-23 RX ADMIN — ENOXAPARIN SODIUM SCH MG: 100 INJECTION SUBCUTANEOUS at 13:02

## 2022-05-23 RX ADMIN — LISINOPRIL SCH MG: 10 TABLET ORAL at 08:35

## 2022-05-23 RX ADMIN — SODIUM CHLORIDE SCH MLS/HR: 900 INJECTION, SOLUTION INTRAVENOUS at 18:24

## 2022-05-23 RX ADMIN — SODIUM CHLORIDE SCH MLS/HR: 900 INJECTION, SOLUTION INTRAVENOUS at 05:58

## 2022-05-23 RX ADMIN — CARBOXYMETHYLCELLULOSE SODIUM SCH EACH: 5 SOLUTION/ DROPS OPHTHALMIC at 08:35

## 2022-05-23 RX ADMIN — AZITHROMYCIN SCH MG: 250 TABLET, FILM COATED ORAL at 08:35

## 2022-05-23 NOTE — PHYSICAL THERAPY EVALUATION
PT Evaluation-General


Medical Diagnosis


Admission Date


May 21, 2022 at 12:44


Medical Diagnosis:  UTI/pneumonia/hyponatremia/hypomagnesia


Onset Date:  May 21, 2022





Therapy Diagnosis


Therapy Diagnosis:  generalized weakness/debility





Height/Weight


Height (Feet):  5


Height (Inches):  0.00


Weight (Pounds):  144


Weight (Ounces):  0.0





Precautions


Precautions/Isolations:  Fall Prevention, Standard Precautions





Referral


Physician:  Herson


Reason for Referral:  Evaluation/Treatment





Medical History


Pertinent Medical History:  DM, HTN


Current History


ER secondary to dizziness/weakness, N&V, diarrhea


Reviewed History:  Yes





Social History


Home:  Single Level


Current Living Status:  Spouse


Entry Into Home:  Stairs With Railing


PT Steps Into Home:  3





Prior


Prior Level of Function


SCALE: Activities may be completed with or without assistive devices.





6-Indepedent-patient completes the activity by him/herself with no assistance 

from a helper.


5-Set-up or Clean-up Assistance-helper sets up or cleans up; patient completes 

activity. Gregory assists only prior to or  


    following the activity.


4-Supervision or Touching Assistance-helper provides verbal cues and/or 

touching/steadying and/or contact guard assistance as patient completes 

activity. Assistance may be provided   


    throughout the activity or intermittently.


3-Partial/Moderate Assistance-helper does LESS THAN HALF the effort. Gregory 

lifts, holds or supports trunk or limbs, but provides less than half the effort.


2-Substantial/Maximal Assistance-helper does MORE THAN HALF the effort. Gregory 

lifts or holds trunk or limbs and provides more than half the effort.


5-Wvucktrws-mscxkq does ALL the effort. Patient does none of the effort to 

complete the activity. Or, the assistance of 2 or more helpers is required for 

the patient to complete the  


    activity.


If activity was not attempted, code reason:


7-Patient Refused.


9-Not Applicable-not attempted and the patient did not perform the activity 

before the current illness, exacerbation or injury.


10-Not Attempted due to Environmental Limitations-(lack of equipment, weather 

restraints, etc.).


88-Not Attempted due to Medical Conditions or Safety Concerns.


Bed Mobility:  6


Transfers (B,C,W/C):  6


Gait:  6


Stairs:  6


Indoor Mobility (Ambulation):  Independent


Stairs:  Independent


Prior Devices Use:  None


Prior Device Use:  cane and FWW





PT Evaluation-Current


Subjective


Patient agrees to PT.  Spouse present.





Objective


Patient Orientation:  Normal For Age


Attachments:  IV





ROM/Strength


ROM Lower Extremities


bilateral LE WFL


Strength Lower Extremities


3/5 grossly bilateral LE all planes





Integumentary/Posture


Bowel Incontinence:  No


Bladder Incontinence:  No


Posture


WFL





Neuromuscular


(Tone, Coordination, Reflexes)


grossly intact





Sensory


Vision:  Functional


Hearing:  Impaired





Transfers


Lying to Sitting/Side of Bed(Q:  3


Sit to Stand (QC):  3


Chair/Bed-to-Chair Xfer(QC):  3


Toilet Transfer (QC):  3





Gait


Does the Patient Walk?:  Yes


Mode of Locomotion:  Walk


Anticipated Mode of Locomotion:  Walk


Walk 10 feet (QC):  3


Walk 50 ft with 2 Turns(QC):  3


Walk 150 ft (QC):  88


Distance:  100'


Gait Assistive Device:  FWW


Comments/Gait Description


functional gait sequence/patient became "shaky" during ambulation due to fatigue





Balance


Sitting Static:  Normal


Sitting Dynamic:  Normal


Standing Static:  Fair


 Standing Dynamic:  Fair





Assessment/Needs


86 y.o. female, will benefit from skilled PT to address functional strength and 

mobility to improve current LOF to safely return to home at maximum LOF.


Rehab Potential:  Fair





PT Long Term Goals


Long Term Goals


PT Long Term Goals Time Frame:  Jun 4, 2022


Roll Left & Right (QC):  6


Sit to Lying (QC):  6


Lying-Sitting on Side/Bed(QC):  6


Sit to Stand (QC):  6


Chair/Bed-to-Chair Xfer(QC):  6


Toilet Transfer (QC):  6


Walk 10 feet (QC):  6


Walk 50ft with 2 Turns (QC):  6


Walk 150 ft (QC):  6





PT Plan


Problem List


Problem List:  Activity Tolerance, Functional Strength, Safety, Balance, Gait, 

Transfer, Bed Mobility





Treatment/Plan


Treatment Plan:  Continue Plan of Care


Treatment Plan:  Bed Mobility, Education, Functional Strength, Gait, Safety, 

Therapeutic Exercise, Transfers


Treatment Duration:  Jun 4, 2022


Frequency:  6 times per week


Estimated Hrs Per Day:  .25 hour per day


Patient and/or Family Agrees t:  Yes





Time/GCodes


Time In:  945


Time Out:  1001


Total Billed Treatment Time:  16


Total Billed Treatment


1 visit


EVModC 16 min











MASOOD SETH PT              May 23, 2022 10:27

## 2022-05-23 NOTE — DIAGNOSTIC IMAGING REPORT
PROCEDURE: CT chest without contrast.



TECHNIQUE: Multiple contiguous axial images were obtained through

the chest without the use of intravenous contrast. Auto Exposure

Controls were utilized during the CT exam to meet ALARA standards

for radiation dose reduction. 



INDICATION:  Pneumonia. Abnormal chest x-ray. History of previous

right mastectomy.



Comparison with chest x-ray from 05/21/2022.



FINDINGS: There is moderate right pleural effusion. Small left

pleural effusion. Heart is not enlarged. There is moderate

atelectasis in the right lung base. There is dense consolidated

alveolar infiltrate posteriorly in the right upper lobe. The left

lung is clear. No parenchymal masses are seen. No mediastinal or

hilar adenopathy pathologic size. No axillary adenopathy has

developed. There are no blastic or lytic bony changes.



IMPRESSION:

1. There is consolidated infiltrate in the right upper lobe

consistent with pneumonia.

2. There is a moderate right pleural effusion with associated

atelectasis of the right lung base.



Dictated by: 



  Dictated on workstation # [id number]

## 2022-05-23 NOTE — PROGRESS NOTE
Subjective


Subjective/Events-last exam


Pt states she is still feeling exhausted and weak and shaky when she got up with

PT today. She denies significant jaw pain, says it hurts intermittently.





Focused Exam


Lactate Level


5/21/22 09:00: Lactic Acid Level 1.72





Objective


Exam


Last Set of Vital Signs





Vital Signs








  Date Time  Temp Pulse Resp B/P (MAP) Pulse Ox O2 Delivery O2 Flow Rate FiO2


 


5/23/22 11:39 36.8 78 18 166/79 (108) 96 Room Air  


 


5/21/22 15:30        21





Capillary Refill : Less Than 3 Seconds


I&O











Intake and Output 


 


 5/23/22





 00:00


 


Intake Total 2560 ml


 


Output Total 2400 ml


 


Balance 160 ml


 


 


 


Intake Oral 1560 ml


 


IV Total 1000 ml


 


Output Urine Total 2400 ml


 


# Bowel Movements 1








General:  Alert


Lungs:  Clear to Auscultation, Normal Air Movement


Heart:  Regular Rate


Abdomen:  Normal Bowel Sounds, Soft


Extremities:  No Edema


Neuro:  Normal Speech


Psych/Mental Status:  Mood NL





Results/Procedures


Lab


Laboratory Tests


5/22/22 15:24: Glucometer 169H


5/22/22 20:01: Glucometer 158H


5/23/22 05:34: Glucometer 157H


5/23/22 06:15: 


White Blood Count 8.3, Red Blood Count 3.50L, Hemoglobin 9.9L, Hematocrit 30L, 

Mean Corpuscular Volume 86, Mean Corpuscular Hemoglobin 28, Mean Corpuscular 

Hemoglobin Concent 33, Red Cell Distribution Width 13.1, Platelet Count 391, 

Mean Platelet Volume 9.3, Immature Granulocyte % (Auto) 1, Neutrophils (%) 

(Auto) 83H, Lymphocytes (%) (Auto) 9L, Monocytes (%) (Auto) 6, Eosinophils (%) 

(Auto) 1, Basophils (%) (Auto) 0, Neutrophils # (Auto) 6.9, Lymphocytes # (Auto)

0.8L, Monocytes # (Auto) 0.5, Eosinophils # (Auto) 0.1, Basophils # (Auto) 0.0, 

Immature Granulocyte # (Auto) 0.1, Sodium Level 134L, Potassium Level 3.4L, 

Chloride Level 105, Carbon Dioxide Level 16L, Anion Gap 13, Blood Urea Nitrogen 

7, Creatinine 0.70, Estimat Glomerular Filtration Rate 84, BUN/Creatinine Ratio 

10, Glucose Level 156H, Calcium Level 8.2L


5/23/22 10:48: Glucometer 220H





Microbiology


5/21/22 Blood Culture - Preliminary, Resulted


          No growth


5/21/22 Urine Culture - Preliminary, Resulted





Assessment/Plan


Assessment/Plan





(1) Pneumonia involving right lung


Status:  Acute


Assessment & Plan:  Atypical appearance and in spite of normalized WBC and 

improved hyponatremia, she is still quite weak and feeling poorly, will get CT 

for further delineation. Continue azithromycin and ceftriaxone.


Qualifiers:  


   Qualified Codes:  J18.9 - Pneumonia, unspecified organism


(2) Generalized weakness


Status:  Acute


Assessment & Plan:  PT





(3) Dizziness


Status:  Acute


Assessment & Plan:  Suspect secondary to pneumonia, hyponatremia and 

dehydration.





(4) Hyponatremia


Status:  Acute


Assessment & Plan:  Suspect hypovolemic. Improving, continue IVF.





(5) Hypothyroidism


Status:  Chronic


Assessment & Plan:  TSH okay on this admit, resume home levothyroxine





(6) DVT prophylaxis


Status:  Acute


Assessment & Plan:  Enoxaparin














NEL SAMANIEGO MD             May 23, 2022 12:03

## 2022-05-24 VITALS — SYSTOLIC BLOOD PRESSURE: 156 MMHG | DIASTOLIC BLOOD PRESSURE: 76 MMHG

## 2022-05-24 VITALS — SYSTOLIC BLOOD PRESSURE: 130 MMHG | DIASTOLIC BLOOD PRESSURE: 59 MMHG

## 2022-05-24 VITALS — DIASTOLIC BLOOD PRESSURE: 76 MMHG | SYSTOLIC BLOOD PRESSURE: 185 MMHG

## 2022-05-24 VITALS — DIASTOLIC BLOOD PRESSURE: 63 MMHG | SYSTOLIC BLOOD PRESSURE: 171 MMHG

## 2022-05-24 VITALS — DIASTOLIC BLOOD PRESSURE: 82 MMHG | SYSTOLIC BLOOD PRESSURE: 174 MMHG

## 2022-05-24 VITALS — SYSTOLIC BLOOD PRESSURE: 192 MMHG | DIASTOLIC BLOOD PRESSURE: 83 MMHG

## 2022-05-24 VITALS — SYSTOLIC BLOOD PRESSURE: 175 MMHG | DIASTOLIC BLOOD PRESSURE: 78 MMHG

## 2022-05-24 LAB
BUN/CREAT SERPL: 7
CALCIUM SERPL-MCNC: 8.6 MG/DL (ref 8.5–10.1)
CHLORIDE SERPL-SCNC: 106 MMOL/L (ref 98–107)
CO2 SERPL-SCNC: 16 MMOL/L (ref 21–32)
CREAT SERPL-MCNC: 0.73 MG/DL (ref 0.6–1.3)
GFR SERPLBLD BASED ON 1.73 SQ M-ARVRAT: 80 ML/MIN
GLUCOSE SERPL-MCNC: 183 MG/DL (ref 70–105)
HCT VFR BLD CALC: 33 % (ref 35–52)
HGB BLD-MCNC: 10.7 G/DL (ref 11.5–16)
MCH RBC QN AUTO: 29 PG (ref 25–34)
MCHC RBC AUTO-ENTMCNC: 33 G/DL (ref 32–36)
MCV RBC AUTO: 88 FL (ref 80–99)
PLATELET # BLD: 457 10^3/UL (ref 130–400)
PMV BLD AUTO: 9.5 FL (ref 9–12.2)
POTASSIUM SERPL-SCNC: 3.2 MMOL/L (ref 3.6–5)
SODIUM SERPL-SCNC: 136 MMOL/L (ref 135–145)
WBC # BLD AUTO: 8.1 10^3/UL (ref 4.3–11)

## 2022-05-24 RX ADMIN — SODIUM CHLORIDE SCH MLS/HR: 900 INJECTION, SOLUTION INTRAVENOUS at 06:20

## 2022-05-24 RX ADMIN — ENOXAPARIN SODIUM SCH MG: 100 INJECTION SUBCUTANEOUS at 23:49

## 2022-05-24 RX ADMIN — INSULIN ASPART SCH UNIT: 100 INJECTION, SOLUTION INTRAVENOUS; SUBCUTANEOUS at 15:31

## 2022-05-24 RX ADMIN — SODIUM CHLORIDE SCH MLS/HR: 900 INJECTION, SOLUTION INTRAVENOUS at 23:49

## 2022-05-24 RX ADMIN — IPRATROPIUM BROMIDE AND ALBUTEROL SULFATE SCH ML: .5; 3 SOLUTION RESPIRATORY (INHALATION) at 08:13

## 2022-05-24 RX ADMIN — INSULIN ASPART SCH UNIT: 100 INJECTION, SOLUTION INTRAVENOUS; SUBCUTANEOUS at 11:00

## 2022-05-24 RX ADMIN — CARBOXYMETHYLCELLULOSE SODIUM SCH EACH: 5 SOLUTION/ DROPS OPHTHALMIC at 17:22

## 2022-05-24 RX ADMIN — INSULIN ASPART SCH UNIT: 100 INJECTION, SOLUTION INTRAVENOUS; SUBCUTANEOUS at 20:24

## 2022-05-24 RX ADMIN — ENOXAPARIN SODIUM SCH MG: 100 INJECTION SUBCUTANEOUS at 13:01

## 2022-05-24 RX ADMIN — SODIUM CHLORIDE SCH MLS/HR: 900 INJECTION, SOLUTION INTRAVENOUS at 14:50

## 2022-05-24 RX ADMIN — ACETAMINOPHEN PRN MG: 500 TABLET ORAL at 20:36

## 2022-05-24 RX ADMIN — AZITHROMYCIN SCH MG: 250 TABLET, FILM COATED ORAL at 09:04

## 2022-05-24 RX ADMIN — INSULIN ASPART SCH UNIT: 100 INJECTION, SOLUTION INTRAVENOUS; SUBCUTANEOUS at 05:11

## 2022-05-24 RX ADMIN — IPRATROPIUM BROMIDE AND ALBUTEROL SULFATE SCH ML: .5; 3 SOLUTION RESPIRATORY (INHALATION) at 21:22

## 2022-05-24 RX ADMIN — CARBOXYMETHYLCELLULOSE SODIUM SCH EACH: 5 SOLUTION/ DROPS OPHTHALMIC at 09:04

## 2022-05-24 RX ADMIN — CARBOXYMETHYLCELLULOSE SODIUM SCH EACH: 5 SOLUTION/ DROPS OPHTHALMIC at 13:01

## 2022-05-24 RX ADMIN — CEFTRIAXONE SCH MLS/HR: 1 INJECTION, SOLUTION INTRAVENOUS at 09:03

## 2022-05-24 RX ADMIN — CARBOXYMETHYLCELLULOSE SODIUM SCH EACH: 5 SOLUTION/ DROPS OPHTHALMIC at 20:24

## 2022-05-24 RX ADMIN — LISINOPRIL SCH MG: 10 TABLET ORAL at 09:04

## 2022-05-24 RX ADMIN — LEVOTHYROXINE SODIUM SCH MCG: 125 TABLET ORAL at 06:19

## 2022-05-24 NOTE — PHYSICAL THERAPY DAILY NOTE
PT Daily Note-Current


Subjective


Patient reports she is feeling better.  Agrees to PT.





Mental Status


Patient Orientation:  Normal For Age


Attachments:  IV





Transfers


SCALE: Activities may be completed with or without assistive devices.





6-Indepedent-patient completes the activity by him/herself with no assistance 

from a helper.


5-Set-up or Clean-up Assistance-helper sets up or cleans up; patient completes 

activity. Shiro assists only prior to or  


    following the activity.


4-Supervision or Touching Assistance-helper provides verbal cues and/or 

touching/steadying and/or contact guard assistance as patient completes 

activity. Assistance may be provided   


    throughout the activity or intermittently.


3-Partial/Moderate Assistance-helper does LESS THAN HALF the effort. Shiro 

lifts, holds or supports trunk or limbs, but provides less than half the effort.


2-Substantial/Maximal Assistance-helper does MORE THAN HALF the effort. Shiro 

lifts or holds trunk or limbs and provides more than half the effort.


7-Mxsmewyxk-xbigul does ALL the effort. Patient does none of the effort to 

complete the activity. Or, the assistance of 2 or more helpers is required for 

the patient to complete the  


    activity.


If activity was not attempted, code reason:


7-Patient Refused.


9-Not Applicable-not attempted and the patient did not perform the activity 

before the current illness, exacerbation or injury.


10-Not Attempted due to Environmental Limitations-(lack of equipment, weather 

restraints, etc.).


88-Not Attempted due to Medical Conditions or Safety Concerns.


Sit to Stand (QC):  4





Gait Training


Distance:  200'


Walk 10 feet (QC):  4


Walk 50 ft with 2 Turns(QC):  4


Walk 150 ft (QC):  4


Gait Assistive Device:  FWW


slow, steady gait sequence





Assessment


Patient remains up in recliner with needs met.  Patient fatigues with minimal 

activity.  Improved functional endurance.





PT Long Term Goals


Long Term Goals


PT Long Term Goals Time Frame:  Jun 4, 2022


Roll Left & Right (QC):  6


Sit to Lying (QC):  6


Lying-Sitting on Side/Bed(QC):  6


Sit to Stand (QC):  6


Chair/Bed-to-Chair Xfer(QC):  6


Toilet Transfer (QC):  6


Walk 10 feet (QC):  6


Walk 50ft with 2 Turns (QC):  6


Walk 150 ft (QC):  6





PT Plan


Treatment/Plan


Treatment Plan:  Continue Plan of Care


Treatment Plan:  Bed Mobility, Education, Functional Strength, Gait, Safety, 

Therapeutic Exercise, Transfers


Treatment Duration:  Jun 4, 2022


Frequency:  6 times per week


Estimated Hrs Per Day:  .25 hour per day


Patient and/or Family Agrees t:  Yes





Time/GCodes


Time In:  1055


Time Out:  1107


Total Billed Treatment Time:  12


Total Billed Treatment


1 visit


FA 12 min











MASOOD SETH PT              May 24, 2022 11:41

## 2022-05-24 NOTE — PHYSICIAN QUERY CLARIFICATION
Physician Query-General


Query to Physician:


The medical record reflects the following clinical evidence:





Clinical Indicators: Admission VS/LABS: Vital Signs: ,  RR 23, /56, 

SpO2 97% sat on RA  T 36.5 increased to 38.0 within 12 hours, WBC 12.4, Lactic 

acid 1.72. BC x 2 no growth to date, urine culture with Mixed bacterial marina


Risk Factor(s): Advanced age, Pneumonia


Treatment:  ER: NS 1L Ceftriaxone, Azithromycin IV,  





1. Sepsis, unspecified organism, present on admission


2. Other explanation of clinical findings


3. Unable to determine (no explanation for clinical findings)





Please clarify and document your clinical opinion in the progress notes and 

discharge summary including the definitive and/or presumptive diagnosis, 

(suspected or probable), related to the above clinical findings. Please include 

clinical findings supporting your diagnosis.





Breana Stephen MSN, RN


Clinical Nurse Specialist


[phone]


[email or web address]





PHYSICIAN RESPONSE:


Based on the clinical findings in the record, please respond to the query above 

on this document as an addendum. 








Physician Response:


Physician Response


1














If you have questions please contact:


                   


:


Ext:





Thank you for your time and cooperation.


Clinical /








*********************This is a permanent part of the medical 

record*******************











BREANA STEPHEN                   May 24, 2022 15:47


NEL SAMANIEGO MD             May 25, 2022 11:34

## 2022-05-24 NOTE — PROGRESS NOTE
Subjective


Subjective/Events-last exam


Afebrile, states she was feeling poorly this morning, but better now that she 

got cleaned up. She is still feeling very weak, but wants to go home rather than

rehab or SNF if possible.





Objective


Exam


Last Set of Vital Signs





Vital Signs








  Date Time  Temp Pulse Resp B/P (MAP) Pulse Ox O2 Delivery O2 Flow Rate FiO2


 


5/24/22 08:15     95 Room Air  


 


5/24/22 07:39 36.6 68 18 185/76 (112)    


 


5/21/22 15:30        21





Capillary Refill : Less Than 3 Seconds


I&O











Intake and Output 


 


 5/24/22





 00:00


 


Intake Total 2690 ml


 


Output Total 2675 ml


 


Balance 15 ml


 


 


 


Intake Oral 1690 ml


 


IV Total 1000 ml


 


Output Urine Total 2675 ml








General:  Alert, No Acute Distress


Lungs:  Clear to Auscultation, Normal Air Movement


Heart:  Regular Rate, No Murmurs


Neuro:  Normal Speech


Psych/Mental Status:  Mood NL





Results/Procedures


Lab


Laboratory Tests


5/23/22 15:39: Glucometer 159H


5/23/22 20:14: Glucometer 128H


5/24/22 05:01: Glucometer 128H


5/24/22 09:25: 


White Blood Count 8.1, Red Blood Count 3.72L, Hemoglobin 10.7L, Hematocrit 33L, 

Mean Corpuscular Volume 88, Mean Corpuscular Hemoglobin 29, Mean Corpuscular 

Hemoglobin Concent 33, Red Cell Distribution Width 13.2, Platelet Count 457H, 

Mean Platelet Volume 9.5, Sodium Level 136, Potassium Level 3.2L, Chloride Level

106, Carbon Dioxide Level 16L, Anion Gap 14, Blood Urea Nitrogen 5L, Creatinine 

0.73, Estimat Glomerular Filtration Rate 80, BUN/Creatinine Ratio 7, Glucose 

Level 183H, Calcium Level 8.6


5/24/22 10:25: Glucometer 174H





Microbiology


5/21/22 Blood Culture - Preliminary, Resulted


          No growth


5/21/22 Urine Culture - Final, Complete


          Mixed Bacterial Lela


Radiology


CT chest 5/23/22: IMPRESSION:


1. There is consolidated infiltrate in the right upper lobe consistent with 

pneumonia.


2. There is a moderate right pleural effusion with associated atelectasis of the

right lung base.





Assessment/Plan


Assessment/Plan





(1) Pneumonia involving right lung


Status:  Acute


Assessment & Plan:  Atypical appearance and in spite of normalized WBC and 

improved hyponatremia, she is still quite weak and feeling poorly, will get CT 

for further delineation. Continue azithromycin and ceftriaxone.


5/24- CT consistent with pneumonia, clinically continues to improve slowly.


Qualifiers:  


   Qualified Codes:  J18.9 - Pneumonia, unspecified organism


(2) Generalized weakness


Status:  Acute


Assessment & Plan:  PT


5/24 discussed rehab/SNF, she wants to try to work with PT today and hopes to go

home tomorrow





(3) Dizziness


Status:  Acute


Assessment & Plan:  Suspect secondary to pneumonia, hyponatremia and 

dehydration.





(4) Hyponatremia


Status:  Resolved


Assessment & Plan:  Suspect hypovolemic. Improving, continue IVF.





(5) Hypothyroidism


Status:  Chronic


Assessment & Plan:  TSH okay on this admit, resume home levothyroxine





(6) DVT prophylaxis


Status:  Acute


Assessment & Plan:  Enoxaparin














NEL SAMANIEGO MD             May 24, 2022 11:49

## 2022-05-25 VITALS — SYSTOLIC BLOOD PRESSURE: 194 MMHG | DIASTOLIC BLOOD PRESSURE: 80 MMHG

## 2022-05-25 VITALS — DIASTOLIC BLOOD PRESSURE: 84 MMHG | SYSTOLIC BLOOD PRESSURE: 187 MMHG

## 2022-05-25 VITALS — DIASTOLIC BLOOD PRESSURE: 84 MMHG | SYSTOLIC BLOOD PRESSURE: 172 MMHG

## 2022-05-25 VITALS — SYSTOLIC BLOOD PRESSURE: 165 MMHG | DIASTOLIC BLOOD PRESSURE: 68 MMHG

## 2022-05-25 LAB
BUN/CREAT SERPL: 4
CALCIUM SERPL-MCNC: 8.9 MG/DL (ref 8.5–10.1)
CHLORIDE SERPL-SCNC: 108 MMOL/L (ref 98–107)
CO2 SERPL-SCNC: 17 MMOL/L (ref 21–32)
CREAT SERPL-MCNC: 0.68 MG/DL (ref 0.6–1.3)
GFR SERPLBLD BASED ON 1.73 SQ M-ARVRAT: 85 ML/MIN
GLUCOSE SERPL-MCNC: 149 MG/DL (ref 70–105)
HCT VFR BLD CALC: 33 % (ref 35–52)
HGB BLD-MCNC: 10.9 G/DL (ref 11.5–16)
MCH RBC QN AUTO: 28 PG (ref 25–34)
MCHC RBC AUTO-ENTMCNC: 33 G/DL (ref 32–36)
MCV RBC AUTO: 86 FL (ref 80–99)
PLATELET # BLD: 548 10^3/UL (ref 130–400)
PMV BLD AUTO: 9.3 FL (ref 9–12.2)
POTASSIUM SERPL-SCNC: 3.7 MMOL/L (ref 3.6–5)
SODIUM SERPL-SCNC: 136 MMOL/L (ref 135–145)
WBC # BLD AUTO: 7.3 10^3/UL (ref 4.3–11)

## 2022-05-25 RX ADMIN — SODIUM CHLORIDE SCH MLS/HR: 900 INJECTION, SOLUTION INTRAVENOUS at 10:55

## 2022-05-25 RX ADMIN — CEFTRIAXONE SCH MLS/HR: 1 INJECTION, SOLUTION INTRAVENOUS at 08:47

## 2022-05-25 RX ADMIN — SODIUM CHLORIDE SCH MLS/HR: 900 INJECTION, SOLUTION INTRAVENOUS at 06:13

## 2022-05-25 RX ADMIN — INSULIN ASPART SCH UNIT: 100 INJECTION, SOLUTION INTRAVENOUS; SUBCUTANEOUS at 06:17

## 2022-05-25 RX ADMIN — AZITHROMYCIN SCH MG: 250 TABLET, FILM COATED ORAL at 08:47

## 2022-05-25 RX ADMIN — IPRATROPIUM BROMIDE AND ALBUTEROL SULFATE SCH ML: .5; 3 SOLUTION RESPIRATORY (INHALATION) at 10:50

## 2022-05-25 RX ADMIN — LEVOTHYROXINE SODIUM SCH MCG: 125 TABLET ORAL at 06:13

## 2022-05-25 RX ADMIN — CARBOXYMETHYLCELLULOSE SODIUM SCH EACH: 5 SOLUTION/ DROPS OPHTHALMIC at 08:47

## 2022-05-25 NOTE — DISCHARGE SUMMARY
Discharge Summary


Hospital Course


Problems/Diagnosis:  


(1) Pneumonia involving right lung


Status:  Acute


Assessment & Plan:  Atypical appearance and in spite of normalized WBC and 

improved hyponatremia, she is still quite weak and feeling poorly, will get CT 

for further delineation. Continue azithromycin and ceftriaxone.


5/24- CT consistent with pneumonia, clinically continues to improve slowly.


5/25- completed 5 day course of azithromycin and ceftriaxone, will continue 2 

more days of cefdinir outpatient


Qualifiers:  


   Qualified Codes:  J18.9 - Pneumonia, unspecified organism


(2) Generalized weakness


Status:  Acute


Assessment & Plan:  PT


5/24 discussed rehab/SNF, she wants to try to work with PT today and hopes to go

home tomorrow


5/25 doing fairly well with PT and walker, has walker at home, discharged with 

 PT





(3) Dizziness


Status:  Resolved


Resolution Date/Time:  5/25/22 @ 11:26


Assessment & Plan:  Suspect secondary to pneumonia, hyponatremia and 

dehydration.





(4) Hyponatremia


Status:  Resolved


Resolution Date/Time:  5/24/22 @ 11:49


Assessment & Plan:  Suspect hypovolemic, resolved.





(5) Hypothyroidism


Status:  Chronic


Assessment & Plan:  TSH okay on this admit, resume home levothyroxine





Hospital Course


Date of Admission: May 21, 2022 at 12:44 


Admission Diagnosis :  





Family Physician/Provider: Tino Ac MD  





Date of Discharge: 5/25/22 


Discharge Diagnosis: 


See problem list








Hospital Course:


See problem list














Labs and Pending Lab Test:


Laboratory Tests


5/24/22 15:03: Glucometer 97


5/24/22 20:23: Glucometer 123H


5/25/22 06:16: Glucometer 100


5/25/22 08:30: 


White Blood Count 7.3, Red Blood Count 3.86, Hemoglobin 10.9L, Hematocrit 33L, 

Mean Corpuscular Volume 86, Mean Corpuscular Hemoglobin 28, Mean Corpuscular 

Hemoglobin Concent 33, Red Cell Distribution Width 13.2, Platelet Count 548H, 

Mean Platelet Volume 9.3, Sodium Level 136, Potassium Level 3.7, Chloride Level 

108H, Carbon Dioxide Level 17L, Anion Gap 11, Blood Urea Nitrogen 3L, Creatinine

0.68, Estimat Glomerular Filtration Rate 85, BUN/Creatinine Ratio 4, Glucose 

Level 149H, Calcium Level 8.9


5/25/22 11:17: Glucometer 156H





Microbiology


5/21/22 Blood Culture - Preliminary, Resulted


          No growth


5/21/22 Urine Culture - Final, Complete


          Mixed Bacterial Lela





Home Meds


Active


Cefdinir 300 Mg Capsule 300 Mg PO BID 2 Days


Lisinopril 10 Mg Tablet 10 Mg PO DAILY


Reported


Refresh Tears (Carboxymethylcellulose Sodium) 0.5 % Drops 1-2 Drops OP QID PRN


Tylenol Extra Strength (Acetaminophen) 500 Mg Tablet 500-1,000 Mg PO Q8H PRN


Neurontin (Gabapentin) 300 Mg Capsule 300 Mg PO TID


Levothyroxine Sodium 125 Mcg Tablet 125 Mcg PO DAILY


Pantoprazole Sodium 40 Mg Tablet.dr 40 Mg PO DAILY


Sucralfate 1 Gram Tablet 1 Gm PO TIDAC


Metformin HCl 1,000 Mg Tablet 1,000 Mg PO BID WITH MEALS


Assessment/Pt DC Instructions


Follow up with primary physician within a week of discharge.


Discharge Diet:  ADA Diet


Activity as Tolerated:  Yes





Discharge Physical Examination


Allergies:  


Coded Allergies:  


     atorvastatin (Verified  Allergy, Unknown, 10/1/15)


     ibuprofen (Verified  Allergy, Unknown, 10/1/15)


     loratadine (Verified  Allergy, Unknown, 10/1/15)


     morphine (Verified  Allergy, Unknown, 10/1/15)


     ofloxacin (Verified  Allergy, Unknown, 10/1/15)


General Appearance:  No Apparent Distress


Respiratory:  Lungs Clear, Normal Breath Sounds


Cardiovascular:  Regular Rate, Rhythm, No Murmur


Gastrointestinal:  Normal Bowel Sounds, Non Tender, Soft


Extremity:  No Pedal Edema


Skin:  Normal Color, Warm/Dry


Neurologic/Psychiatric:  Alert, Normal Mood/Affect











NEL SAMANIEGO MD             May 25, 2022 11:27

## 2022-05-25 NOTE — PHYSICAL THERAPY DAILY NOTE
PT Daily Note-Current


Subjective


Pt. sitting up in recliner with  at her side.  Pt. comments that she 

feels better and vickie every day and has had great car from nursing and PT.





Pain





   Location:  No Pain Reported





Mental Status


Patient Orientation:  Normal For Age


Attachments:  IV





Transfers


SCALE: Activities may be completed with or without assistive devices.





6-Indepedent-patient completes the activity by him/herself with no assistance 

from a helper.


5-Set-up or Clean-up Assistance-helper sets up or cleans up; patient completes 

activity. [place] assists only prior to or  


    following the activity.


4-Supervision or Touching Assistance-helper provides verbal cues and/or 

touching/steadying and/or contact guard assistance as patient completes 

activity. Assistance may be provided   


    throughout the activity or intermittently.


3-Partial/Moderate Assistance-helper does LESS THAN HALF the effort. [place] 

lifts, holds or supports trunk or limbs, but provides less than half the effort.


2-Substantial/Maximal Assistance-helper does MORE THAN HALF the effort. [place] 

lifts or holds trunk or limbs and provides more than half the effort.


8-Dfkrtzhov-ynteyn does ALL the effort. Patient does none of the effort to 

complete the activity. Or, the assistance of 2 or more helpers is required for 

the patient to complete the  


    activity.


If activity was not attempted, code reason:


7-Patient Refused.


9-Not Applicable-not attempted and the patient did not perform the activity 

before the current illness, exacerbation or injury.


10-Not Attempted due to Environmental Limitations-(lack of equipment, weather 

restraints, etc.).


88-Not Attempted due to Medical Conditions or Safety Concerns.


Sit to Stand (QC):  6


cued for us of hands on arms of chair to stand and safe backing to chair to sit





Gait Training


Does the Patient Walk?:  Yes


Walk 10 feet (QC):  4


Walk 50 ft with 2 Turns(QC):  4


Walk 150 ft (QC):  4


Gait Persons Needed:  1


Gait Assistive Device:  FWW


SBA and assist only for IV, slow but safe gait with even step length and good 

turning technique





Exercises


Seated Therapy Exercises:  Ankle pumps, Sit to stand, Long arc quads, Hip 

flexion, Hip abd/add


Seated Reps:  10





Treatments


ex and gait as above, up i chair after Rx with naida bose at hand and  

there also





Assessment


Current Status:  Good Progress





PT Long Term Goals


Long Term Goals


PT Long Term Goals Time Frame:  Jun 4, 2022


Roll Left & Right (QC):  6


Sit to Lying (QC):  6


Lying-Sitting on Side/Bed(QC):  6


Sit to Stand (QC):  6


Chair/Bed-to-Chair Xfer(QC):  6


Toilet Transfer (QC):  6


Walk 10 feet (QC):  6


Walk 50ft with 2 Turns (QC):  6


Walk 150 ft (QC):  6





PT Plan


Treatment/Plan


Treatment Plan:  Continue Plan of Care


Treatment Plan:  Bed Mobility, Education, Functional Strength, Gait, Safety, 

Therapeutic Exercise, Transfers


Treatment Duration:  Jun 4, 2022


Frequency:  6 times per week


Estimated Hrs Per Day:  .25 hour per day


Patient and/or Family Agrees t:  Yes





Safety Risks/Education


Patient Education:  Gait Training, Transfer Techniques, Correct Positioning, 

Safety Issues


Teaching Recipient:  Patient


Teaching Methods:  Demonstration, Discussion


Response to Teaching:  Verbalize Understanding, Return Demonstration, 

Reinforcement Needed





Time/GCodes


Time In:  800


Time Out:  823


Total Billed Treatment Time:  23


Total Billed Treatment


1,GT15m,Ex8m











JIA OBRIEN PTA             May 25, 2022 08:30

## 2022-05-25 NOTE — D/C HH FACE TO FACE ORDER
D/C  Face to Face Orders


Instructions for Patient


Patient Instructions/FollowUp:  


Follow up with primary doctor within a week of discharge.


Physician to follow Patient:  [facility]


Discharge Diet for Home:  ADA Diet





Patient Data-Allergies,Ht & Wt


Patient Allergies:  


Coded Allergies:  


     atorvastatin (Verified  Allergy, Unknown, 10/1/15)


     ibuprofen (Verified  Allergy, Unknown, 10/1/15)


     loratadine (Verified  Allergy, Unknown, 10/1/15)


     morphine (Verified  Allergy, Unknown, 10/1/15)


     ofloxacin (Verified  Allergy, Unknown, 10/1/15)


Height (Feet):  5


Height (Inches):  0.00


Weight (Pounds):  144


Weight (Ounces):  0.0





Home Health Need/Face to Face


Date of Face to Face:  May 25, 2022


Clinical Findings:  Generalized weakness and fatigue


I have seen Pt face-to-face:  Yes


Discharged To:  Home


Diagnosis/Conditions:  


See problem list


Patient is Homebound due to:  Mini fall risk due to instabilty, Muscle weakness


Homebound Status


   Due to the above stated illness, injury or surgical procedure (medical 

condition or diagnosis) and associated clinical findings, the patient is 

homebound because of his/her inability to leave home except with aid of a 

supportive device and/or person AND leaving the home requires a considerable and

taxing effort or is medically contraindicated.


Pt req the following assistanc:  Aid of another person, Walker





Home Health Nursing Orders


Home Health Services Order:  Nursing Services, Physical Therapy-Evaluate & Treat





Home Health Infusion Therapy


Line Start Date:  May 21, 2022





Therapy Orders


Therapy Orders:  Physical Therapy


Certify Stmt


I certify that this patient is under my care and that I, a nurse practitioner or

a physician; a assistant working with me, had a face to face encounter that -

meets the physician face to face encounter requirements with this patient as 

dated.





Diagnosis/Problems


Problems/Diagonsis





(1) Pneumonia involving right lung


Status:  Acute


Qualifiers:  


   Qualified Codes:  J18.9 - Pneumonia, unspecified organism


(2) Hyponatremia


Status:  Resolved


(3) Hypothyroidism


Status:  Chronic


(4) Generalized weakness


Status:  Acute


(5) Hypomagnesemia


Status:  Acute











NEL SAMANIEGO MD             May 25, 2022 11:25

## 2023-05-09 ENCOUNTER — HOSPITAL ENCOUNTER (OUTPATIENT)
Dept: HOSPITAL 75 - CARDFS | Age: 88
End: 2023-05-09
Attending: NURSE PRACTITIONER
Payer: MEDICARE

## 2023-05-09 DIAGNOSIS — I35.1: Primary | ICD-10-CM

## 2023-05-09 PROCEDURE — 93306 TTE W/DOPPLER COMPLETE: CPT

## 2023-07-01 ENCOUNTER — HOSPITAL ENCOUNTER (EMERGENCY)
Dept: HOSPITAL 75 - ER FS | Age: 88
Discharge: HOME | End: 2023-07-01
Payer: MEDICARE

## 2023-07-01 VITALS — DIASTOLIC BLOOD PRESSURE: 75 MMHG | SYSTOLIC BLOOD PRESSURE: 169 MMHG

## 2023-07-01 DIAGNOSIS — Z88.8: ICD-10-CM

## 2023-07-01 DIAGNOSIS — J06.9: Primary | ICD-10-CM

## 2023-07-01 PROCEDURE — 99283 EMERGENCY DEPT VISIT LOW MDM: CPT

## 2023-07-01 PROCEDURE — 87636 SARSCOV2 & INF A&B AMP PRB: CPT

## 2023-07-01 PROCEDURE — 71046 X-RAY EXAM CHEST 2 VIEWS: CPT

## 2023-07-01 NOTE — DIAGNOSTIC IMAGING REPORT
INDICATION: Productive cough and shortness of breath.



PA and lateral chest obtained at 10:43 a.m.



Heart and mediastinal silhouette are normal in appearance. The

lungs are clear. There is no pneumothorax or pleural fluid. There

are surgical clips in the right axillary region.



IMPRESSION:



No acute process in the chest.



Dictated by: 



  Dictated on workstation # [id number]

## 2023-07-01 NOTE — ED GENERAL
General


Chief Complaint:  Cough/Cold/Flu Symptoms


Stated Complaint:  SORE THROAT; GENERAL PAIN


Nursing Triage Note:  


PT REPORTS SHE HAS HAD A COUGH, SORE THROAT, HEADACHE, AND PRESSURE BEHIND BOTH 


EARS FOR 3 DAYS.  COUGH IS PRODUCTIVE AND CLEAR SPUTUM.


Source of Information:  Patient, Old Records


Exam Limitations:  No Limitations





History of Present Illness


Date Seen by Provider:  2023


Time Seen by Provider:  10:10


Initial Comments


87yoF with PMH of HTN and DM most pertinently coming in due to URI type 

symptoms. She has has sinus drainage since Thursday with sore throat and some 

cough. She also is feeling sinus pressure. No fever, chest pain, SOA, abd pain, 

n/v/d, weakness, numbness, or any other concerns. She has tried tylenol thus 

far.





Allergies and Home Medications


Allergies


Coded Allergies:  


     atorvastatin (Verified  Allergy, Unknown, 10/1/15)


     ibuprofen (Verified  Allergy, Unknown, 10/1/15)


     loratadine (Verified  Allergy, Unknown, 10/1/15)


     morphine (Verified  Allergy, Unknown, 10/1/15)


     ofloxacin (Verified  Allergy, Unknown, 10/1/15)





Patient Home Medication List


Home Medication List Reviewed:  Yes


Acetaminophen (Tylenol Extra Strength) 500 Mg Tablet, 500-1,000 MG PO Q8H PRN 

for PAIN-MILD (1-4), (Reported)


   Entered as Reported by: RANDA KUMAR on 22


Carboxymethylcellulose Sodium (Refresh Tears) 0.5 % Drops, 1-2 DROPS OP QID PRN 

for DRY EYES, (Reported)


   Entered as Reported by: RANDA KUMAR on 22


Cefdinir (Cefdinir) 300 Mg Capsule, 300 MG PO BID


   Prescribed by: ENL SAMANIEGO on 22 1121


Cetirizine HCl (Cetirizine HCl) 10 Mg Tablet, 10 MG PO DAILY


   Prescribed by: VENKATESH CAMACHO on 23 105


Fluticasone Propionate (Flonase Allergy Relief) 50 Mcg/Actuation Edwards.susp, 1 

SPRAY NS DAILY


   Prescribed by: VENKATESH CAMACHO on 23 105


Gabapentin (Neurontin) 300 Mg Capsule, 300 MG PO TID, (Reported)


   Entered as Reported by: RANDA KUMAR on 22


Levothyroxine Sodium (Levothyroxine Sodium) 125 Mcg Tablet, 125 MCG PO DAILY, 

(Reported)


   Entered as Reported by: RANDA KUMAR on 22


Lisinopril (Lisinopril) 10 Mg Tablet, 10 MG PO DAILY


   Prescribed by: NEL SAMANIEGO on 22 112


Metformin HCl (Metformin HCl) 1,000 Mg Tablet, 1,000 MG PO BID WITH MEALS, 

(Reported)


   Entered as Reported by: J LUIS IVERSON on 22 1443


Pantoprazole Sodium (Pantoprazole Sodium) 40 Mg Tablet.dr, 40 MG PO DAILY, 

(Reported)


   Entered as Reported by: J LUIS IVERSON on 22 1443


Sucralfate (Sucralfate) 1 Gram Tablet, 1 GM PO TIDAC, (Reported)


   Entered as Reported by: J LUIS IVERSON on 22 1443





Review of Systems


Review of Systems


Constitutional:  No fever


EENTM:  see HPI


Respiratory:  see HPI


Cardiovascular:  no symptoms reported


Gastrointestinal:  no symptoms reported


Genitourinary:  no symptoms reported





Past Medical-Social-Family Hx


Patient Social History


Tobacco Use?:  No


Use of E-Cig and/or Vaping dev:  No


Substance use?:  No


Alcohol Use?:  No


Pt feels they are or have been:  No





Immunizations Up To Date


First/Initial COVID19 Vaccinat:  2021


Second COVID19 Vaccination John:  3/9/2021


Third COVID19 Vaccination Date:  2021





Seasonal Allergies


Seasonal Allergies:  Yes





Past Medical History


Surgery/Hospitalization HX:  


None


Surgeries:  Yes (RIGHT BREAST LUMPECTOMY, BACK SX, RIGHT BREAST MASTECTOMY)


Gallbladder, Hysterectomy


Respiratory:  No


Cardiac:  Yes


Heart Murmur, Hypertension


Neurological:  No


Reproductive Disorders:  No


Genitourinary:  Yes


UTI-Chronic


Gastrointestinal:  No


Musculoskeletal:  Yes


Arthritis, Chronic Back Pain


Endocrine:  Yes


Hypothyroidsim, Diabetes, Non-Insulin dep


Hearing Impairment:  Hard of Hearing


Cancer:  Yes


Breast


Psychosocial:  No


Integumentary:  No


Blood Disorders:  No





Physical Exam


Vital Signs





Vital Signs - First Documented








 23





 10:20


 


Temp 36.5


 


Pulse 114


 


Resp 16


 


B/P (MAP) 169/75 (106)


 


Pulse Ox 96


 


O2 Delivery Room Air





Capillary Refill : Less Than 3 Seconds


Height, Weight, BMI


Height: 5'0.00"


Weight: 144lbs. 0.0oz. 65.108591ls; 61.58 BMI


Method:


General Appearance:  No Apparent Distress, WD/WN


Eyes:  Bilateral Eye Normal Inspection, Bilateral Eye PERRL


HEENT:  PERRL/EOMI, TMs Normal, Other (Mild oropharyngeal erythema without 

exudate, uvula midline, normal voice, no trismus)


Neck:  Full Range of Motion, Normal Inspection, Non Tender, Supple


Respiratory:  Chest Non Tender, No Accessory Muscle Use, No Respiratory 

Distress, Rales (Worse on the right lung base which cleared with subsequent 

breath)


Cardiovascular:  Regular Rate, Rhythm, No Edema, Normal Peripheral Pulses


Gastrointestinal:  No Distended


Extremity:  Normal Capillary Refill, Normal Inspection, Normal Range of Motion, 

Non Tender, No Calf Tenderness, No Pedal Edema


Neurologic/Psychiatric:  Alert, Normal Mood/Affect


Skin:  Normal Color, Warm/Dry


Lymphatic:  No Adenopathy





Progress/Results/Core Measures


Suspected Sepsis


SIRS


Temperature: 


Pulse: 114 


Respiratory Rate: 16


 


Blood Pressure 169 /75 


Mean: 106





Results/Orders


Lab Results





Laboratory Tests








Test


 23


10:37 Range/Units


 








My Orders





Orders - VENKATESH CAMACHO MD


Chest Pa/Lat (2 View) (23 10:30)


Covid 19 Inhouse Test (23 10:30)





Vital Signs/I&O











 23





 10:20


 


Temp 36.5


 


Pulse 114


 


Resp 16


 


B/P (MAP) 169/75 (106)


 


Pulse Ox 96


 


O2 Delivery Room Air





Capillary Refill : Less Than 3 Seconds








Blood Pressure Mean:                    106








Progress Note :  


Progress Note


87-year-old female with above history coming in for URI type symptoms.  ABCs 

were intact and vitals are stable on presentation.  Physical exam reassuring 

including her being well-appearing, she did have some crackles in her right lung

base which cleared with subsequent breaths, likely atelectasis.  Chest x-ray 

ordered and interpreted by me showing no obvious pneumonia, normal cardiac 

silhouette, no pleural effusion.  COVID test also sent.  Patient likely has a 

viral illness which will improve with time.  We will send a prescription for 

antihistamine.





Diagnostic Imaging





   Diagonstic Imaging:  Xray (chest)


Comments


NAME:   RAQUEL BRAR


MED REC#:   [id number]


ACCOUNT#:   [id number]


PT STATUS:   REG ER


:   1935


PHYSICIAN:   VENKATESH CAMACHO MD


ADMIT DATE:   23/ER FS


                                   ***Draft***


Date of Exam:23





CHEST PA/LAT (2 VIEW)








INDICATION: Productive cough and shortness of breath.





PA and lateral chest obtained at 10:43 a.m.





Heart and mediastinal silhouette are normal in appearance. The


lungs are clear. There is no pneumothorax or pleural fluid. There


are surgical clips in the right axillary region.





IMPRESSION:





No acute process in the chest.





  Dictated on workstation # [id number]








Dict:   23 [id number]


Trans:   23 1047


CVB [id number]





Interpreted by:     GEMMA RANDOLPH MD


Electronically signed by:





Departure


Impression





   Primary Impression:  


   URI (upper respiratory infection)


   Qualified Codes:  J06.9 - Acute upper respiratory infection, unspecified


Disposition:   HOME, SELF-CARE


Condition:  Stable





Departure-Patient Inst.


Decision time for Depature:  11:00


Referrals:  


KELLY DONOVAN MD (PCP)


Primary Care Physician


Patient Instructions:  Viral Syndrome (DC)





Add. Discharge Instructions:  


There are no signs of pneumonia today, no need for antibiotics at this time.  Of

course things can change, so have follow-up with your primary care provider, 

especially if you continue to worsen.  Continue to take ibuprofen as needed for 

the pain.  You can also try tea with honey for your sore throat.  A prescription

will be sent to your pharmacy that may help with your congestion as well.  

Typically these illnesses just need to run their course, and take roughly 7 to 

10 days to improve.


Scripts


Fluticasone Propionate (Flonase Allergy Relief) 50 Mcg/Actuation Edwards.susp


1 SPRAY NS DAILY for 30 Days, #1 EACH


   1 SPRAY EACH NARE DAILY


   Prov: VENKATESH CAMACHO MD         23 


Cetirizine HCl (Cetirizine HCl) 10 Mg Tablet


10 MG PO DAILY for 30 Days, #30 TAB


   Prov: VENKATESH CAMACHO MD         23











VENKATESH CAMACHO MD           2023 10:26